# Patient Record
Sex: FEMALE | Race: BLACK OR AFRICAN AMERICAN | Employment: FULL TIME | URBAN - METROPOLITAN AREA
[De-identification: names, ages, dates, MRNs, and addresses within clinical notes are randomized per-mention and may not be internally consistent; named-entity substitution may affect disease eponyms.]

---

## 2018-02-16 PROBLEM — Z34.01 PRIMIGRAVIDA IN FIRST TRIMESTER: Status: ACTIVE | Noted: 2018-02-16

## 2018-03-14 PROBLEM — D57.1 MATERNAL SICKLE CELL ANEMIA COMPLICATING PREGNANCY IN FIRST TRIMESTER (HCC): Status: ACTIVE | Noted: 2018-03-14

## 2018-03-14 PROBLEM — O99.841 PREGNANCY AFFECTED BY PREVIOUS BARIATRIC SURGERY, CURRENTLY IN FIRST TRIMESTER: Status: ACTIVE | Noted: 2018-03-14

## 2018-03-14 PROBLEM — J45.909 ASTHMA AFFECTING PREGNANCY, ANTEPARTUM: Status: ACTIVE | Noted: 2018-03-14

## 2018-03-14 PROBLEM — O99.011 MATERNAL SICKLE CELL ANEMIA COMPLICATING PREGNANCY IN FIRST TRIMESTER (HCC): Status: ACTIVE | Noted: 2018-03-14

## 2018-03-14 PROBLEM — O99.519 ASTHMA AFFECTING PREGNANCY, ANTEPARTUM: Status: ACTIVE | Noted: 2018-03-14

## 2018-03-14 PROBLEM — O99.211 OBESITY AFFECTING PREGNANCY IN FIRST TRIMESTER: Status: ACTIVE | Noted: 2018-03-14

## 2018-03-14 PROBLEM — Z36.82 NUCHAL TRANSLUCENCY OF FETUS ON PRENATAL ULTRASOUND: Status: ACTIVE | Noted: 2018-03-14

## 2018-03-14 PROBLEM — O09.91 HIGH-RISK PREGNANCY IN FIRST TRIMESTER: Status: ACTIVE | Noted: 2018-02-16

## 2018-04-09 PROBLEM — O09.92 HIGH-RISK PREGNANCY IN SECOND TRIMESTER: Status: ACTIVE | Noted: 2018-02-16

## 2018-04-09 PROBLEM — O99.842 PREGNANCY AFFECTED BY PREVIOUS BARIATRIC SURGERY, CURRENTLY IN SECOND TRIMESTER: Status: ACTIVE | Noted: 2018-03-14

## 2018-04-09 PROBLEM — O99.012 MATERNAL SICKLE CELL ANEMIA COMPLICATING PREGNANCY IN SECOND TRIMESTER (HCC): Status: ACTIVE | Noted: 2018-03-14

## 2018-04-09 PROBLEM — O99.212 OBESITY AFFECTING PREGNANCY IN SECOND TRIMESTER: Status: ACTIVE | Noted: 2018-03-14

## 2018-07-11 PROBLEM — O99.843 PREGNANCY AFFECTED BY PREVIOUS BARIATRIC SURGERY, CURRENTLY IN THIRD TRIMESTER: Status: ACTIVE | Noted: 2018-03-14

## 2018-07-11 PROBLEM — O99.013 MATERNAL SICKLE CELL ANEMIA COMPLICATING PREGNANCY IN THIRD TRIMESTER (HCC): Status: ACTIVE | Noted: 2018-03-14

## 2018-07-11 PROBLEM — O99.613 GASTROESOPHAGEAL REFLUX DURING PREGNANCY, ANTEPARTUM, THIRD TRIMESTER: Status: ACTIVE | Noted: 2018-07-11

## 2018-07-11 PROBLEM — O99.213 OBESITY AFFECTING PREGNANCY IN THIRD TRIMESTER: Status: ACTIVE | Noted: 2018-03-14

## 2018-07-11 PROBLEM — K21.9 GASTROESOPHAGEAL REFLUX DURING PREGNANCY, ANTEPARTUM, THIRD TRIMESTER: Status: ACTIVE | Noted: 2018-07-11

## 2018-07-11 PROBLEM — O09.93 HIGH-RISK PREGNANCY IN THIRD TRIMESTER: Status: ACTIVE | Noted: 2018-02-16

## 2018-08-29 PROBLEM — R82.71 GBS BACTERIURIA: Status: ACTIVE | Noted: 2018-02-07

## 2018-09-10 ENCOUNTER — HOSPITAL ENCOUNTER (EMERGENCY)
Age: 28
Discharge: HOME | End: 2018-09-10
Attending: OBSTETRICS & GYNECOLOGY | Admitting: OBSTETRICS & GYNECOLOGY
Payer: COMMERCIAL

## 2018-09-10 VITALS
BODY MASS INDEX: 36.31 KG/M2 | HEIGHT: 63 IN | RESPIRATION RATE: 16 BRPM | OXYGEN SATURATION: 97 % | HEART RATE: 67 BPM | TEMPERATURE: 98.5 F | SYSTOLIC BLOOD PRESSURE: 112 MMHG | DIASTOLIC BLOOD PRESSURE: 64 MMHG

## 2018-09-10 PROBLEM — R10.9 ABDOMINAL PAIN DURING PREGNANCY IN THIRD TRIMESTER: Status: ACTIVE | Noted: 2018-09-10

## 2018-09-10 PROBLEM — O26.893 ABDOMINAL PAIN DURING PREGNANCY IN THIRD TRIMESTER: Status: ACTIVE | Noted: 2018-09-10

## 2018-09-10 PROCEDURE — 74011250637 HC RX REV CODE- 250/637: Performed by: OBSTETRICS & GYNECOLOGY

## 2018-09-10 PROCEDURE — 59025 FETAL NON-STRESS TEST: CPT

## 2018-09-10 PROCEDURE — 99283 EMERGENCY DEPT VISIT LOW MDM: CPT

## 2018-09-10 PROCEDURE — 75810000275 HC EMERGENCY DEPT VISIT NO LEVEL OF CARE: Performed by: EMERGENCY MEDICINE

## 2018-09-10 RX ORDER — ZOLPIDEM TARTRATE 5 MG/1
5 TABLET ORAL
Status: COMPLETED | OUTPATIENT
Start: 2018-09-11 | End: 2018-09-10

## 2018-09-10 RX ADMIN — ZOLPIDEM TARTRATE 5 MG: 5 TABLET ORAL at 23:43

## 2018-09-10 NOTE — IP AVS SNAPSHOT
Summary of Care Report The Summary of Care report has been created to help improve care coordination. Users with access to [employer] or [address] (Web-based application) may access additional patient information including the Discharge Summary. If you are not currently a [address] user and need more information, please call the number listed below in the [place] [address] section and ask to be connected with Medical Records. Facility Information Name Address Phone [address] [place] [address] [address] [place] [id number] [phone] Patient Information Patient Name Sex SNOW Jasso ([id number]) Female 1990 Discharge Information Admitting Provider Service Area Unit Donna Hoover MD / [id number] [facility] [address] / [phone] Discharge Provider Discharge Date/Time Discharge Disposition Destination (none) (none) (none) (none) Patient Language Language ENGLISH [13] Hospital Problems as of 9/10/2018  Reviewed: 2018 11:07 PM by France Smith MD  
  
  
  
 Class Noted - Resolved Last Modified POA Active Problems Abdominal pain during pregnancy in third trimester  9/10/2018 - Present 9/10/2018 by Donna Hoover MD Unknown Entered by Donna Hoover MD  
  
Non-Hospital Problems as of 9/10/2018  Reviewed: 2018 11:07 PM by France Smith MD  
  
  
  
 Class Noted - Resolved Last Modified Active Problems High-risk pregnancy in third trimester  2018 - Present 2018 by France Smith MD  
  Entered by France Smith MD  
  Overview Addendum 2018 11:30 PM by France Smith MD  
   8 wk (2018):  10 m s/p gastric sleeve ([place]). Has lost 117 lb. LDA  from 12-16 wk.  Early glucola per pt doesn't vinod sugar, if not tolerated, alt screen: fasting and postprandial blood sugars for one week. Nutritional status, watch for IUGR 
+ sickle cell trait, FOB status:  pending:  ? urine cx q trimester:  1st trimester neg,  2nd trimester neg,  3rd trimester. No h/o HSV. 12 wk: Advised to start baby asa/stop 36 wk 
7/11/2018 at [facility]: Normal repeat echo. AC 40%, overall 54%, CHRISTINA 17cm, BPP 8/8, Dopplers WNL 35 wk:  Stopped baby aspirin a a few weeks ago. Pregnancy affected by previous bariatric surgery, currently in third trimester  3/14/2018 - Present 7/11/2018 by Elner Moritz, MD  
  Entered by Jonah Rowe RN Overview Addendum 7/11/2018 10:46 AM by Elner Moritz, MD  
   3/14/2018 at [facility]:  Hx of Gastric Sleeve in 4/2017. Lost 117# 
5/21/2018 at [facility]: Patient having right sided pain and will have radiology appt 5/23/2018 for review of gallbladder. 7/11/2018 at [facility]:  Was given Rx for meter June 2018- pt states she has been testing blood sugars and they have been between . Glucose logs given to patient and encouraged to send to [place] for us to review. · Patient to send logs next week; if >2 elevations during week of testing, then: · Refer to [facility]. · Then refer back to MFM for DM management to be seen in ~2-3 weeks. Obesity affecting pregnancy in third trimester  3/14/2018 - Present 7/11/2018 by Maryjane Best RN Entered by Jonah Rowe RN Asthma affecting pregnancy, antepartum  3/14/2018 - Present 7/11/2018 by Maryjane Best RN Entered by Jonah Rowe RN Overview Addendum 7/11/2018 10:30 AM by Maryjane Best RN  
   3/14/2018 at [facility]:  Stable on Albuterol prn. Pt states only has flairs when \"has a cold\". 4/9/2018 at [facility]:  Stable on Albuterol prn.  
7/11/2018 at [facility]:  stable · Continue Albuterol prn.  
  
  
  Maternal sickle cell anemia complicating pregnancy in third trimester ([address]  3/14/2018 - Present 8/13/2018 by Samuel Valiente MD  
 Entered by Annie Kennedy RN Overview Addendum 8/13/2018  9:32 AM by Trupti Zavala MD  
   3/14/2018 at [facility]:  Pt with Sickle Cell Trait. FOB states negative. 3/19/2018:  FOB, Janet Kiran, is negative carrier. Tested 3/13/18. 
7/11/2018 at [facility]:  Patient is at increased risk for asymptomatic bacturia and UTI. Needs screening for UTI with UA and culture at next OB visit 8/8/18 bactiuria  Enterobacter cloacae -10,000-25,000 colony forming units per ml (7/25/18 txd E coli uti). Macrobid bid x 7 d then qd suppression Thickening of nuchal fold  3/14/2018 - Present 7/11/2018 by Pam Parr MD  
  Entered by Annie Kennedy RN Overview Addendum 7/11/2018 10:47 AM by Pam Parr MD  
   3/14/2018 at [facility]:  NT upper normal limits at 2.7 mm extending to mid back, no septations, not >2 SD above normal but appearance raises clinical suspicion, NB present. Genetic counseling done by physician today. Pt wants NIPT, to primary OB for redraw on 3/20/2018---> low risk Panorama 4/9/2018 at [facility]:  Normal early Anatomy/Fetal Echo. This is reassuring with a negative NIPS. Questions answered. Would like to reevaluate anatomy and echo in 6 weeks. 16 + wk:  declines MSAFP. 
5/21/2018 at [facility]: Appropriate fetal growth without IUGR, Normal fetal echo, reassuring fetal status. No aneuploidy markers seen. %, overall 53%, CHRISTINA  19.5cm, UA Dopplers WNL. Patient and  reassured. Small risk of cardiac defect. 7/11/2018 [facility]: reassuring fetal status. Gastroesophageal reflux during pregnancy, antepartum, third trimester  7/11/2018 - Present 7/11/2018 by Pam Parr MD  
  Entered by Hector Keller RN Overview Addendum 7/11/2018 10:39 AM by Hector Keller RN  
    
7/11/2018 at [facility]: Pt with increase in nausea and reflux. Using Prilosec daily and states it hasn't been working well.  
· Script sent for to add zantac BID 
  
  
 GBS bacteriuria  2/7/2018 - Present 8/29/2018 by Amador Johns MD  
  Entered by Amador Johns MD  
  Overview Signed 8/29/2018 11:39 PM by Amador Johns MD  
   Needs GBS PROPHYLAXIS during labor You are allergic to the following No active allergies Current Discharge Medication List  
  
ASK your doctor about these medications Dose & Instructions Dispensing Information Comments  
 albuterol 5 mg/mL nebulizer solution Commonly known as:  PROVENTIL  
 by Nebulization route once. Refills:  0 Blood-Glucose Meter monitoring kit Check blood sugar before first meal, an hour after each meal and at bedtime Quantity:  1 Kit Refills:  0  
   
 CALCIUM 500 PO Dose:  1000 mg Take 1,000 mg by mouth. Refills:  0  
   
 glucose blood VI test strips strip Commonly known as:  ASCENSIA AUTODISC VI, ONE TOUCH ULTRA TEST VI Check blood sugar before first meal, an hour after each meal and at bedtime Quantity:  200 Strip Refills:  3 Lancets Misc Check blood sugar before first meal, an hour after each meal and at bedtime Quantity:  1 Each Refills:  11  
   
 nitrofurantoin (macrocrystal-monohydrate) 100 mg capsule Commonly known as:  MACROBID Take one tab twice a day for ten days, then one a day until delivery. Quantity:  60 Cap Refills:  0  
   
 omeprazole 20 mg capsule Commonly known as:  PRILOSEC Dose:  20 mg Take 20 mg by mouth daily. Refills:  0  
   
 ondansetron hcl 8 mg tablet Commonly known as:  Donnel Blank Dose:  8 mg Take 1 Tab by mouth every eight (8) hours as needed for Nausea. Quantity:  30 Tab Refills:  3 PNV66-Iron Fumarate-FA-DSS-DHA 27-1. 25- mg Cap Commonly known as:  PNV-DHA + DOCUSATE Dose:  1 Cap Take 1 Cap by mouth daily. Which ever generic PNV/DHA insurance will cover  Indications: pregnancy Quantity:  90 Cap Refills:  3  
   
 raNITIdine 150 mg tablet Commonly known as:  ZANTAC Dose:  150 mg Take 1 Tab by mouth two (2) times a day. Indications: gastroesophageal reflux disease, Heartburn Quantity:  60 Tab Refills:  5  
   
 TYLENOL 325 mg tablet Generic drug:  acetaminophen Take  by mouth every four (4) hours as needed for Pain. Refills:  0  
   
 VITAMIN D3 2,000 unit Tab Generic drug:  cholecalciferol (vitamin D3) Take  by mouth. Refills:  0 Follow-up Information None Discharge Instructions Keep your appointment in the office as scheduled. Return to the hospital with regular frequent contractions, bright red vaginal bleeding, signs your water has broken, a decrease in baby movement or any other concerns. Week 38 of Your Pregnancy: Care Instructions Your Care Instructions Believe it or not, your baby is almost here. You may have ideas about your baby's personality because of how much he or she moves. Or you may have noticed how he or she responds to sounds, warmth, cold, and light. You may even know what kind of music your baby likes. By now, you have a better idea of what to expect during delivery. You may have talked about your birth preferences with your doctor. But even if you want a vaginal birth, it is a good idea to learn about  births.  birth means that your baby is born through a cut (incision) in your lower belly. It is sometimes the best choice for the health of the baby and the mother. This care sheet can help you understand  births. It also gives you information about what to expect after your baby is born. And it helps you understand more about postpartum depression. Follow-up care is a key part of your treatment and safety. Be sure to make and go to all appointments, and call your doctor if you are having problems. It's also a good idea to know your test results and keep a list of the medicines you take. How can you care for yourself at home? Learn about  birth · Most C-sections are unplanned. They are done because of problems that occur during labor. These problems might include: 
¨ Labor that slows or stops. ¨ High blood pressure or other problems for the mother. ¨ Signs of distress in the baby. These signs may include a very fast or slow heart rate. · Although most mothers and babies do well after , it is major surgery. It has more risks than a vaginal delivery. · In some cases, a planned  may be safer than a vaginal delivery. This may be the case if: ¨ The mother has a health problem, such as a heart condition. ¨ The baby isn't in a head-down position for delivery. This is called a breech position. ¨ The uterus has scars from past surgeries. This could increase the chance of a tear in the uterus. ¨ There is a problem with the placenta. ¨ The mother has an infection, such as genital herpes, that could be spread to the baby. ¨ The mother is having twins or more. ¨ The baby weighs 9 to 10 pounds or more. · Because of the risks of , planned C-sections generally should be done only for medical reasons. And a planned  should be done at 39 weeks or later unless there is a medical reason to do it sooner. Know what to expect after delivery, and plan for the first few weeks at home · You, your baby, and your partner or  will get identification bands. Only people with matching bands can  the baby from the nursery. · You will learn how to feed, diaper, and bathe your baby. And you will learn how to care for the umbilical cord stump. If your baby will be circumcised, you will also learn how to care for that. · Ask people to wait to visit you until you are at home. And ask them to wash their hands before they touch your baby. · Make sure you have another adult in your home for at least 2 or 3 days after the birth. · During the first 2 weeks, limit when friends and family can visit. · Do not allow visitors who have colds or infections. Make sure all visitors are up to date with their vaccinations. Never let anyone smoke around your baby. · Try to nap when the baby naps. Be aware of postpartum depression · \"Baby blues\" are common for the first 1 to 2 weeks after birth. You may cry or feel sad or irritable for no reason. · For some women, these feelings last longer and are more intense. This is called postpartum depression. · If your symptoms last for more than a few weeks or you feel very depressed, ask your doctor for help. · Postpartum depression can be treated. Support groups and counseling can help. Sometimes medicine can also help. Where can you learn more? Go to http://ifeoma-pancho.info/. Enter B044 in the search box to learn more about \"Week 38 of Your Pregnancy: Care Instructions. \" Current as of: 2017 Content Version: 11.7 © 8387-7374 [employer]. Care instructions adapted under license by [employer] (which disclaims liability or warranty for this information). If you have questions about a medical condition or this instruction, always ask your healthcare professional. [place] [address] any warranty or liability for your use of this information. Pregnancy Precautions: Care Instructions Your Care Instructions There is no sure way to prevent labor before your due date ( labor) or to prevent most other pregnancy problems. But there are things you can do to increase your chances of a healthy pregnancy. Go to your appointments, follow your doctor's advice, and take good care of yourself. Eat well, and exercise (if your doctor agrees). And make sure to drink plenty of water. Follow-up care is a key part of your treatment and safety. Be sure to make and go to all appointments, and call your doctor if you are having problems.  It's also a good idea to know your test results and keep a list of the medicines you take. How can you care for yourself at home? · Make sure you go to your prenatal appointments. At each visit, your doctor will check your blood pressure. Your doctor will also check to see if you have protein in your urine. High blood pressure and protein in urine are signs of preeclampsia. This condition can be dangerous for you and your baby. · Drink plenty of fluids, enough so that your urine is light yellow or clear like water. Dehydration can cause contractions. If you have kidney, heart, or liver disease and have to limit fluids, talk with your doctor before you increase the amount of fluids you drink. · Tell your doctor right away if you notice any symptoms of an infection, such as: ¨ Burning when you urinate. ¨ A foul-smelling discharge from your vagina. ¨ Vaginal itching. ¨ Unexplained fever. ¨ Unusual pain or soreness in your uterus or lower belly. · Eat a balanced diet. Include plenty of foods that are high in calcium and iron. ¨ Foods high in calcium include milk, cheese, yogurt, almonds, and broccoli. ¨ Foods high in iron include red meat, shellfish, poultry, eggs, beans, raisins, whole-grain bread, and leafy green vegetables. · Do not smoke. If you need help quitting, talk to your doctor about stop-smoking programs and medicines. These can increase your chances of quitting for good. · Do not drink alcohol or use illegal drugs. · Follow your doctor's directions about activity. Your doctor will let you know how much, if any, exercise you can do. · Ask your doctor if you can have sex. If you are at risk for early labor, your doctor may ask you to not have sex. · Take care to prevent falls. During pregnancy, your joints are loose, and your balance is off. Sports such as bicycling, skiing, or in-line skating can increase your risk of falling. And don't ride horses or motorcycles, dive, water ski, scuba dive, or parachute jump while you are pregnant. · Avoid getting very hot. Do not use saunas or hot tubs. Avoid staying out in the sun in hot weather for long periods. Take acetaminophen (Tylenol) to lower a high fever. · Do not take any over-the-counter or herbal medicines or supplements without talking to your doctor or pharmacist first. 
When should you call for help? Call 911 anytime you think you may need emergency care. For example, call if: 
  · You passed out (lost consciousness).  
  · You have severe vaginal bleeding.  
  · You have severe pain in your belly or pelvis.  
  · You have had fluid gushing or leaking from your vagina and you know or think the umbilical cord is bulging into your vagina. If this happens, immediately get down on your knees so your rear end (buttocks) is higher than your head. This will decrease the pressure on the cord until help arrives.  
[facility] your doctor now or seek immediate medical care if: 
  · You have signs of preeclampsia, such as: 
¨ Sudden swelling of your face, hands, or feet. ¨ New vision problems (such as dimness or blurring). ¨ A severe headache.  
  · You have any vaginal bleeding.  
  · You have belly pain or cramping.  
  · You have a fever.  
  · You have had regular contractions (with or without pain) for an hour. This means that you have 8 or more within 1 hour or 4 or more in 20 minutes after you change your position and drink fluids.  
  · You have a sudden release of fluid from your vagina.  
  · You have low back pain or pelvic pressure that does not go away.  
  · You notice that your baby has stopped moving or is moving much less than normal.  
 Watch closely for changes in your health, and be sure to contact your doctor if you have any problems. Where can you learn more? Go to http://ifeoma-pancho.info/. Enter [id number] in the search box to learn more about \"Pregnancy Precautions: Care Instructions. \" Current as of: November 21, 2017 Content Version: 11.7 © 9656-4035 [employer]. Care instructions adapted under license by [employer] (which disclaims liability or warranty for this information). If you have questions about a medical condition or this instruction, always ask your healthcare professional. [place] [address] any warranty or liability for your use of this information. Chart Review Routing History No Routing History on File

## 2018-09-10 NOTE — IP AVS SNAPSHOT
[address] 
[phone] Patient: Steve Bermudez MRN: [id number] INC:[id number] About your hospitalization You were admitted on:  N/A You last received care in the:  E 4 ERIC You were discharged on:  September 10, 2018 Why you were hospitalized Your primary diagnosis was:  Not on File Your diagnoses also included:  Abdominal Pain During Pregnancy In Third Trimester Follow-up Information None Your Scheduled Appointments Tuesday September 18, 2018  8:45 AM EDT  
(Arrive by 8:30 AM) Return OB with Julio Garza, [address] ([facility]) [address] [address] [phone] Discharge Orders None A check alyssa indicates which time of day the medication should be taken. My Medications ASK your doctor about these medications Instructions Each Dose to Equal  
 Morning Noon Evening Bedtime  
 albuterol 5 mg/mL nebulizer solution Commonly known as:  PROVENTIL Your last dose was: Your next dose is:    
   
   
 by Nebulization route once. Blood-Glucose Meter monitoring kit Your last dose was: Your next dose is:    
   
   
 Check blood sugar before first meal, an hour after each meal and at bedtime CALCIUM 500 PO Your last dose was: Your next dose is: Take 1,000 mg by mouth. 1000 mg  
    
   
   
   
  
 glucose blood VI test strips strip Commonly known as:  ASCENSIA AUTODISC VI, ONE TOUCH ULTRA TEST VI Your last dose was: Your next dose is:    
   
   
 Check blood sugar before first meal, an hour after each meal and at bedtime Lancets Misc Your last dose was: Your next dose is: Check blood sugar before first meal, an hour after each meal and at bedtime  
     
   
   
   
  
 nitrofurantoin (macrocrystal-monohydrate) 100 mg capsule Commonly known as:  MACROBID Your last dose was: Your next dose is: Take one tab twice a day for ten days, then one a day until delivery. omeprazole 20 mg capsule Commonly known as:  PRILOSEC Your last dose was: Your next dose is: Take 20 mg by mouth daily. 20 mg  
    
   
   
   
  
 ondansetron hcl 8 mg tablet Commonly known as:  Sheryl Actis Your last dose was: Your next dose is: Take 1 Tab by mouth every eight (8) hours as needed for Nausea. 8 mg PNV66-Iron Fumarate-FA-DSS-DHA 27-1. 25- mg Cap Commonly known as:  PNV-DHA + DOCUSATE Your last dose was: Your next dose is: Take 1 Cap by mouth daily. Which ever generic PNV/DHA insurance will cover  Indications: pregnancy 1 Cap  
    
   
   
   
  
 raNITIdine 150 mg tablet Commonly known as:  ZANTAC Your last dose was: Your next dose is: Take 1 Tab by mouth two (2) times a day. Indications: gastroesophageal reflux disease, Heartburn 150 mg  
    
   
   
   
  
 TYLENOL 325 mg tablet Generic drug:  acetaminophen Your last dose was: Your next dose is: Take  by mouth every four (4) hours as needed for Pain. VITAMIN D3 2,000 unit Tab Generic drug:  cholecalciferol (vitamin D3) Your last dose was: Your next dose is: Take  by mouth. Discharge Instructions Keep your appointment in the office as scheduled. Return to the hospital with regular frequent contractions, bright red vaginal bleeding, signs your water has broken, a decrease in baby movement or any other concerns. Week 38 of Your Pregnancy: Care Instructions Your Care Instructions Believe it or not, your baby is almost here. You may have ideas about your baby's personality because of how much he or she moves. Or you may have noticed how he or she responds to sounds, warmth, cold, and light. You may even know what kind of music your baby likes. By now, you have a better idea of what to expect during delivery. You may have talked about your birth preferences with your doctor. But even if you want a vaginal birth, it is a good idea to learn about  births.  birth means that your baby is born through a cut (incision) in your lower belly. It is sometimes the best choice for the health of the baby and the mother. This care sheet can help you understand  births. It also gives you information about what to expect after your baby is born. And it helps you understand more about postpartum depression. Follow-up care is a key part of your treatment and safety. Be sure to make and go to all appointments, and call your doctor if you are having problems. It's also a good idea to know your test results and keep a list of the medicines you take. How can you care for yourself at home? Learn about  birth · Most C-sections are unplanned. They are done because of problems that occur during labor. These problems might include: 
¨ Labor that slows or stops. ¨ High blood pressure or other problems for the mother. ¨ Signs of distress in the baby. These signs may include a very fast or slow heart rate. · Although most mothers and babies do well after , it is major surgery. It has more risks than a vaginal delivery. · In some cases, a planned  may be safer than a vaginal delivery. This may be the case if: ¨ The mother has a health problem, such as a heart condition. ¨ The baby isn't in a head-down position for delivery. This is called a breech position. ¨ The uterus has scars from past surgeries. This could increase the chance of a tear in the uterus. ¨ There is a problem with the placenta. ¨ The mother has an infection, such as genital herpes, that could be spread to the baby. ¨ The mother is having twins or more. ¨ The baby weighs 9 to 10 pounds or more. · Because of the risks of , planned C-sections generally should be done only for medical reasons. And a planned  should be done at 39 weeks or later unless there is a medical reason to do it sooner. Know what to expect after delivery, and plan for the first few weeks at home · You, your baby, and your partner or  will get identification bands. Only people with matching bands can  the baby from the nursery. · You will learn how to feed, diaper, and bathe your baby. And you will learn how to care for the umbilical cord stump. If your baby will be circumcised, you will also learn how to care for that. · Ask people to wait to visit you until you are at home. And ask them to wash their hands before they touch your baby. · Make sure you have another adult in your home for at least 2 or 3 days after the birth. · During the first 2 weeks, limit when friends and family can visit. · Do not allow visitors who have colds or infections. Make sure all visitors are up to date with their vaccinations. Never let anyone smoke around your baby. · Try to nap when the baby naps. Be aware of postpartum depression · \"Baby blues\" are common for the first 1 to 2 weeks after birth. You may cry or feel sad or irritable for no reason. · For some women, these feelings last longer and are more intense. This is called postpartum depression. · If your symptoms last for more than a few weeks or you feel very depressed, ask your doctor for help. · Postpartum depression can be treated. Support groups and counseling can help. Sometimes medicine can also help. Where can you learn more? Go to http://ifeoma-pancho.info/. Enter B044 in the search box to learn more about \"Week 38 of Your Pregnancy: Care Instructions. \" Current as of: 2017 Content Version: 11.7 © 4489-0390 [employer]. Care instructions adapted under license by [employer] (which disclaims liability or warranty for this information). If you have questions about a medical condition or this instruction, always ask your healthcare professional. [address] any warranty or liability for your use of this information. Pregnancy Precautions: Care Instructions Your Care Instructions There is no sure way to prevent labor before your due date ( labor) or to prevent most other pregnancy problems. But there are things you can do to increase your chances of a healthy pregnancy. Go to your appointments, follow your doctor's advice, and take good care of yourself. Eat well, and exercise (if your doctor agrees). And make sure to drink plenty of water. Follow-up care is a key part of your treatment and safety. Be sure to make and go to all appointments, and call your doctor if you are having problems. It's also a good idea to know your test results and keep a list of the medicines you take. How can you care for yourself at home? · Make sure you go to your prenatal appointments. At each visit, your doctor will check your blood pressure. Your doctor will also check to see if you have protein in your urine. High blood pressure and protein in urine are signs of preeclampsia. This condition can be dangerous for you and your baby. · Drink plenty of fluids, enough so that your urine is light yellow or clear like water. Dehydration can cause contractions. If you have kidney, heart, or liver disease and have to limit fluids, talk with your doctor before you increase the amount of fluids you drink. · Tell your doctor right away if you notice any symptoms of an infection, such as: ¨ Burning when you urinate. ¨ A foul-smelling discharge from your vagina. ¨ Vaginal itching. ¨ Unexplained fever. ¨ Unusual pain or soreness in your uterus or lower belly. · Eat a balanced diet. Include plenty of foods that are high in calcium and iron. ¨ Foods high in calcium include milk, cheese, yogurt, almonds, and broccoli. ¨ Foods high in iron include red meat, shellfish, poultry, eggs, beans, raisins, whole-grain bread, and leafy green vegetables. · Do not smoke. If you need help quitting, talk to your doctor about stop-smoking programs and medicines. These can increase your chances of quitting for good. · Do not drink alcohol or use illegal drugs. · Follow your doctor's directions about activity. Your doctor will let you know how much, if any, exercise you can do. · Ask your doctor if you can have sex. If you are at risk for early labor, your doctor may ask you to not have sex. · Take care to prevent falls. During pregnancy, your joints are loose, and your balance is off. Sports such as bicycling, skiing, or in-line skating can increase your risk of falling. And don't ride horses or motorcycles, dive, water ski, scuba dive, or parachute jump while you are pregnant. · Avoid getting very hot. Do not use saunas or hot tubs. Avoid staying out in the sun in hot weather for long periods. Take acetaminophen (Tylenol) to lower a high fever. · Do not take any over-the-counter or herbal medicines or supplements without talking to your doctor or pharmacist first. 
When should you call for help? Call 911 anytime you think you may need emergency care.  For example, call if: 
  · You passed out (lost consciousness).  
  · You have severe vaginal bleeding.  
  · You have severe pain in your belly or pelvis.  
  · You have had fluid gushing or leaking from your vagina and you know or think the umbilical cord is bulging into your vagina. If this happens, immediately get down on your knees so your rear end (buttocks) is higher than your head. This will decrease the pressure on the cord until help arrives.  
[facility] your doctor now or seek immediate medical care if: 
  · You have signs of preeclampsia, such as: 
¨ Sudden swelling of your face, hands, or feet. ¨ New vision problems (such as dimness or blurring). ¨ A severe headache.  
  · You have any vaginal bleeding.  
  · You have belly pain or cramping.  
  · You have a fever.  
  · You have had regular contractions (with or without pain) for an hour. This means that you have 8 or more within 1 hour or 4 or more in 20 minutes after you change your position and drink fluids.  
  · You have a sudden release of fluid from your vagina.  
  · You have low back pain or pelvic pressure that does not go away.  
  · You notice that your baby has stopped moving or is moving much less than normal.  
 Watch closely for changes in your health, and be sure to contact your doctor if you have any problems. Where can you learn more? Go to http://ifeoma-pancho.info/. Enter [id number] in the search box to learn more about \"Pregnancy Precautions: Care Instructions. \" Current as of: November 21, 2017 Content Version: 11.7 © 5359-7436 [employer]. Care instructions adapted under license by [employer] (which disclaims liability or warranty for this information). If you have questions about a medical condition or this instruction, always ask your healthcare professional. [place] [address] any warranty or liability for your use of this information. Introducing [facility]! Dear Fabine Elkins: Thank you for requesting a [employer] account. Our records indicate that you already have an active [employer] account. You can access your account anytime at [email or web address] Did you know that you can access your hospital and ER discharge instructions at any time in [email or web address]? You can also review all of your test results from your hospital stay or ER visit. Additional Information If you have questions, please visit the Frequently Asked Questions section of the [employer] website at [email or web address] Remember, [email or web address] is NOT to be used for urgent needs. For medical emergencies, dial 911. Now available from your iPhone and Android! Introducing Dawson Brody As a [employer] patient, I wanted to make you aware of our electronic visit tool called Dawson Brody. [employer] 24/7 allows you to connect within minutes with a medical provider 24 hours a day, seven days a week via a mobile device or tablet or logging into a secure website from your computer. You can access Dawson Brody from anywhere in the [place]. A virtual visit might be right for you when you have a simple condition and feel like you just dont want to get out of bed, or cant get away from work for an appointment, when your regular [employer] provider is not available (evenings, weekends or holidays), or when youre out of town and need minor care. Electronic visits cost only $49 and if the [employer] 24/7 provider determines a prescription is needed to treat your condition, one can be electronically transmitted to a nearby pharmacy*. Please take a moment to enroll today if you have not already done so. The enrollment process is free and takes just a few minutes. To enroll, please download the [employer] 24/7 ruiz to your tablet or phone, or visit [email or web address] to enroll on your computer. And, as an [address] [place] [address] patient with a [employer] account, the results of your visits will be scanned into your electronic medical record and your primary care provider will be able to view the scanned results. We urge you to continue to see your regular [facility] provider for your ongoing medical care. And while your primary care provider may not be the one available when you seek a Dawson Brody virtual visit, the peace of mind you get from getting a real diagnosis real time can be priceless. For more information on Dawson Brody, view our Frequently Asked Questions (FAQs) at [email or web address]. Sincerely, 
 
Partha Toscano MD 
Chief Medical Officer [employer] *:  certain medications cannot be prescribed via Dawson Brody Providers Seen During Your Hospitalization Provider Specialty Primary office phone Sherl Holstein, MD Obstetrics & Gynecology [phone] Your Primary Care Physician (PCP) Primary Care Physician Office Phone Office Fax NONE ** None ** ** None ** You are allergic to the following No active allergies Recent Documentation Height BMI OB Status Smoking Status 1.6 m 36.31 kg/m2 Pregnant Never Smoker Patient Belongings The following personal items are in your possession at time of discharge: 
                             
 
  
  
 Please provide this summary of care documentation to your next provider. Signatures-by signing, you are acknowledging that this After Visit Summary has been reviewed with you and you have received a copy. Patient Signature:  ____________________________________________________________ Date:  ____________________________________________________________  
  
Laine Artist Provider Signature:  ____________________________________________________________ Date:  ____________________________________________________________

## 2018-09-11 NOTE — PROGRESS NOTES
Pt given discharge instructions. Verbalized understanding of her discharge instructions. Pt given Ambien and side effects reviewed with her. Pt ambulated out with her significant other.

## 2018-09-11 NOTE — PROGRESS NOTES
9/10/2018 RE: Raji Moreno To Whom it May Concern: This is to certify that Aramjannette Taty was a visitor with the above patient on 9/10/2018. Thank you for your assistance in this matter. Sincerely, Ignacio Justice RN

## 2018-09-11 NOTE — PROGRESS NOTES
Pt present to the ERIC for complaints of contractions. No leaking of fluid or bleeding and fetal movement is WNL. Pt was seen in the office this morning and was 1cm. Pt last had intercourse this morning after her appointment. She has had intermittent contractions since yesterday but tonight about 1830 they became more regular, Q8 min, and more intense. Pt placed on the monitor.

## 2018-09-11 NOTE — H&P
CC 
Chief Complaint Patient presents with  Contractions History: 
 
33 y.o. female at 38w2d weeks gestation who requesting evaluation for Uterine contractions all day. Hasn't slept since last night. No vb or lof. Fetal movement has been normal 
 
HISTORY: 
OB History  Para Term  AB Living 1 0 0 0 0 0 SAB TAB Ectopic Molar Multiple Live Births  
0 0 0 0 0 0 # Outcome Date GA Lbr Lucas/2nd Weight Sex Delivery Anes PTL Lv  
1 Current History Sexual Activity  Sexual activity: Yes  Partners: Male  Birth control/ protection: None Patient's last menstrual period was 2017. Social History Social History  Marital status: SINGLE Spouse name: N/A  
 Number of children: 0  
 Years of education: 12 Occupational History  Not on file. Social History Main Topics  Smoking status: Never Smoker  Smokeless tobacco: Never Used  Alcohol use No  
 Drug use: No  
 Sexual activity: Yes  
  Partners: Male Birth control/ protection: None Other Topics Concern  Not on file Social History Narrative Past Surgical History:  
Procedure Laterality Date  HX GASTRIC BYPASS  2017  
 sleeve, ? [place]  
 HX OTHER SURGICAL Past Medical History:  
Diagnosis Date  Anemia  Asthma  Sickle cell trait (HCC)   
 trait not disease  [facility] ROS: 
Negative: 
headache , nausea and vomiting, vaginal bleeding  and visual disturbances. Positive: 
contractions. PHYSICAL EXAM: 
Blood pressure 112/64, pulse 67, temperature 98.5 °F (36.9 °C), resp. rate 16, height 5' 3\" (1.6 m), last menstrual period 2017, SpO2 97 %. General: well developed and well nourished Resp:  breath sounds clear and equal bilaterally Card:  RRR, no MRG Abd: WNL. Uterine contractions: irregular, every 8 minutes Fetal Assessment: Baseline FHR: 120 per minute Fetal heart variability: moderate Fetal Heart Rate decelerations: none Fetal Heart Rate accelerations: yes Prestentation: vertex by exam, Pelvic:   External- normal EGBSU w/o lesions SVE- Cervical Exam: too posterior to reach  
0% effaced   
-3 station Ext: edema, clonus and DTR's normal 
 
Assessment: 
33 y.o. female at 38w2d weeks gestation Reassuring fetal status Not in labor. Plan: 
[place] given for sleep., Discharge home with routine labor instructions and warning signs, Keep follow up appointment with regular provider as scheduled, Instructed in fetal activity monitoring Galileo Sinha MD

## 2018-09-11 NOTE — DISCHARGE INSTRUCTIONS
Keep your appointment in the office as scheduled. Return to the hospital with regular frequent contractions, bright red vaginal bleeding, signs your water has broken, a decrease in baby movement or any other concerns. Week 38 of Your Pregnancy: Care Instructions  Your Care Instructions    Believe it or not, your baby is almost here. You may have ideas about your baby's personality because of how much he or she moves. Or you may have noticed how he or she responds to sounds, warmth, cold, and light. You may even know what kind of music your baby likes. By now, you have a better idea of what to expect during delivery. You may have talked about your birth preferences with your doctor. But even if you want a vaginal birth, it is a good idea to learn about  births.  birth means that your baby is born through a cut (incision) in your lower belly. It is sometimes the best choice for the health of the baby and the mother. This care sheet can help you understand  births. It also gives you information about what to expect after your baby is born. And it helps you understand more about postpartum depression. Follow-up care is a key part of your treatment and safety. Be sure to make and go to all appointments, and call your doctor if you are having problems. It's also a good idea to know your test results and keep a list of the medicines you take. How can you care for yourself at home? Learn about  birth  · Most C-sections are unplanned. They are done because of problems that occur during labor. These problems might include:  ¨ Labor that slows or stops. ¨ High blood pressure or other problems for the mother. ¨ Signs of distress in the baby. These signs may include a very fast or slow heart rate. · Although most mothers and babies do well after , it is major surgery. It has more risks than a vaginal delivery.   · In some cases, a planned  may be safer than a vaginal delivery. This may be the case if:  ¨ The mother has a health problem, such as a heart condition. ¨ The baby isn't in a head-down position for delivery. This is called a breech position. ¨ The uterus has scars from past surgeries. This could increase the chance of a tear in the uterus. ¨ There is a problem with the placenta. ¨ The mother has an infection, such as genital herpes, that could be spread to the baby. ¨ The mother is having twins or more. ¨ The baby weighs 9 to 10 pounds or more. · Because of the risks of , planned C-sections generally should be done only for medical reasons. And a planned  should be done at 39 weeks or later unless there is a medical reason to do it sooner. Know what to expect after delivery, and plan for the first few weeks at home  · You, your baby, and your partner or  will get identification bands. Only people with matching bands can  the baby from the nursery. · You will learn how to feed, diaper, and bathe your baby. And you will learn how to care for the umbilical cord stump. If your baby will be circumcised, you will also learn how to care for that. · Ask people to wait to visit you until you are at home. And ask them to wash their hands before they touch your baby. · Make sure you have another adult in your home for at least 2 or 3 days after the birth. · During the first 2 weeks, limit when friends and family can visit. · Do not allow visitors who have colds or infections. Make sure all visitors are up to date with their vaccinations. Never let anyone smoke around your baby. · Try to nap when the baby naps. Be aware of postpartum depression  · \"Baby blues\" are common for the first 1 to 2 weeks after birth. You may cry or feel sad or irritable for no reason. · For some women, these feelings last longer and are more intense. This is called postpartum depression.   · If your symptoms last for more than a few weeks or you feel very depressed, ask your doctor for help. · Postpartum depression can be treated. Support groups and counseling can help. Sometimes medicine can also help. Where can you learn more? Go to http://ifeoma-pancho.info/. Enter B044 in the search box to learn more about \"Week 38 of Your Pregnancy: Care Instructions. \"  Current as of: 2017  Content Version: 11.7  © 7665-3966 [employer]. Care instructions adapted under license by [employer] (which disclaims liability or warranty for this information). If you have questions about a medical condition or this instruction, always ask your healthcare professional. [place] [address] any warranty or liability for your use of this information. Pregnancy Precautions: Care Instructions  Your Care Instructions    There is no sure way to prevent labor before your due date ( labor) or to prevent most other pregnancy problems. But there are things you can do to increase your chances of a healthy pregnancy. Go to your appointments, follow your doctor's advice, and take good care of yourself. Eat well, and exercise (if your doctor agrees). And make sure to drink plenty of water. Follow-up care is a key part of your treatment and safety. Be sure to make and go to all appointments, and call your doctor if you are having problems. It's also a good idea to know your test results and keep a list of the medicines you take. How can you care for yourself at home? · Make sure you go to your prenatal appointments. At each visit, your doctor will check your blood pressure. Your doctor will also check to see if you have protein in your urine. High blood pressure and protein in urine are signs of preeclampsia. This condition can be dangerous for you and your baby. · Drink plenty of fluids, enough so that your urine is light yellow or clear like water. Dehydration can cause contractions.  If you have kidney, heart, or liver disease and have to limit fluids, talk with your doctor before you increase the amount of fluids you drink. · Tell your doctor right away if you notice any symptoms of an infection, such as:  ¨ Burning when you urinate. ¨ A foul-smelling discharge from your vagina. ¨ Vaginal itching. ¨ Unexplained fever. ¨ Unusual pain or soreness in your uterus or lower belly. · Eat a balanced diet. Include plenty of foods that are high in calcium and iron. ¨ Foods high in calcium include milk, cheese, yogurt, almonds, and broccoli. ¨ Foods high in iron include red meat, shellfish, poultry, eggs, beans, raisins, whole-grain bread, and leafy green vegetables. · Do not smoke. If you need help quitting, talk to your doctor about stop-smoking programs and medicines. These can increase your chances of quitting for good. · Do not drink alcohol or use illegal drugs. · Follow your doctor's directions about activity. Your doctor will let you know how much, if any, exercise you can do. · Ask your doctor if you can have sex. If you are at risk for early labor, your doctor may ask you to not have sex. · Take care to prevent falls. During pregnancy, your joints are loose, and your balance is off. Sports such as bicycling, skiing, or in-line skating can increase your risk of falling. And don't ride horses or motorcycles, dive, water ski, scuba dive, or parachute jump while you are pregnant. · Avoid getting very hot. Do not use saunas or hot tubs. Avoid staying out in the sun in hot weather for long periods. Take acetaminophen (Tylenol) to lower a high fever. · Do not take any over-the-counter or herbal medicines or supplements without talking to your doctor or pharmacist first.  When should you call for help? Call 911 anytime you think you may need emergency care.  For example, call if:    · You passed out (lost consciousness).     · You have severe vaginal bleeding.     · You have severe pain in your belly or pelvis.     · You have had fluid gushing or leaking from your vagina and you know or think the umbilical cord is bulging into your vagina. If this happens, immediately get down on your knees so your rear end (buttocks) is higher than your head. This will decrease the pressure on the cord until help arrives.   [facility] your doctor now or seek immediate medical care if:    · You have signs of preeclampsia, such as:  ¨ Sudden swelling of your face, hands, or feet. ¨ New vision problems (such as dimness or blurring). ¨ A severe headache.     · You have any vaginal bleeding.     · You have belly pain or cramping.     · You have a fever.     · You have had regular contractions (with or without pain) for an hour. This means that you have 8 or more within 1 hour or 4 or more in 20 minutes after you change your position and drink fluids.     · You have a sudden release of fluid from your vagina.     · You have low back pain or pelvic pressure that does not go away.     · You notice that your baby has stopped moving or is moving much less than normal.    Watch closely for changes in your health, and be sure to contact your doctor if you have any problems. Where can you learn more? Go to http://ifeoma-pancho.info/. Enter [id number] in the search box to learn more about \"Pregnancy Precautions: Care Instructions. \"  Current as of: November 21, 2017  Content Version: 11.7  © 1008-4373 [employer] ([email or web address]). Care instructions adapted under license by [employer] (which disclaims liability or warranty for this information). If you have questions about a medical condition or this instruction, always ask your healthcare professional. [address] any warranty or liability for your use of this information.

## 2018-09-12 ENCOUNTER — HOSPITAL ENCOUNTER (EMERGENCY)
Age: 28
Discharge: HOME | End: 2018-09-12
Attending: OBSTETRICS & GYNECOLOGY | Admitting: OBSTETRICS & GYNECOLOGY
Payer: COMMERCIAL

## 2018-09-12 VITALS
HEIGHT: 63 IN | DIASTOLIC BLOOD PRESSURE: 63 MMHG | WEIGHT: 205 LBS | SYSTOLIC BLOOD PRESSURE: 111 MMHG | TEMPERATURE: 98 F | RESPIRATION RATE: 18 BRPM | HEART RATE: 81 BPM | BODY MASS INDEX: 36.32 KG/M2

## 2018-09-12 PROCEDURE — 59025 FETAL NON-STRESS TEST: CPT

## 2018-09-12 PROCEDURE — 96372 THER/PROPH/DIAG INJ SC/IM: CPT

## 2018-09-12 PROCEDURE — 99283 EMERGENCY DEPT VISIT LOW MDM: CPT

## 2018-09-12 PROCEDURE — 74011250636 HC RX REV CODE- 250/636: Performed by: OBSTETRICS & GYNECOLOGY

## 2018-09-12 RX ORDER — HYDROMORPHONE HYDROCHLORIDE 2 MG/ML
1 INJECTION, SOLUTION INTRAMUSCULAR; INTRAVENOUS; SUBCUTANEOUS ONCE
Status: COMPLETED | OUTPATIENT
Start: 2018-09-13 | End: 2018-09-12

## 2018-09-12 RX ORDER — PROMETHAZINE HYDROCHLORIDE 25 MG/ML
25 INJECTION, SOLUTION INTRAMUSCULAR; INTRAVENOUS
Status: COMPLETED | OUTPATIENT
Start: 2018-09-13 | End: 2018-09-12

## 2018-09-12 RX ADMIN — PROMETHAZINE HYDROCHLORIDE 25 MG: 25 INJECTION, SOLUTION INTRAMUSCULAR; INTRAVENOUS at 23:38

## 2018-09-12 RX ADMIN — HYDROMORPHONE HYDROCHLORIDE 1 MG: 2 INJECTION, SOLUTION INTRAMUSCULAR; INTRAVENOUS; SUBCUTANEOUS at 23:38

## 2018-09-12 NOTE — IP AVS SNAPSHOT
Summary of Care Report The Summary of Care report has been created to help improve care coordination. Users with access to [employer] or [address] (Web-based application) may access additional patient information including the Discharge Summary. If you are not currently a [address] user and need more information, please call the number listed below in the [place] [address] section and ask to be connected with Medical Records. Facility Information Name Address Phone [address] [place] [address] [address] [place] [id number] [phone] Patient Information Patient Name Sex  Crayton Kussmaul ([id number]) Female 1990 Discharge Information Admitting Provider Service Area Unit Austen Simms MD / [address] [facility] [address] / [phone] Discharge Provider Discharge Date/Time Discharge Disposition Destination (none) (none) (none) (none) Patient Language Language ENGLISH [13] Hospital Problems as of 2018  Reviewed: 2018 11:07 PM by Jaison Mac MD  
  
  
  
 Class Noted - Resolved Last Modified POA Active Problems Abdominal pain during pregnancy in third trimester  9/10/2018 - Present 2018 by Austen Simms MD Unknown Entered by Jayne Quintanilla MD  
  
Non-Hospital Problems as of 2018  Reviewed: 2018 11:07 PM by Jaison Mac MD  
  
  
  
 Class Noted - Resolved Last Modified Active Problems High-risk pregnancy in third trimester  2018 - Present 2018 by Jaison Mac MD  
  Entered by Jaison Mac MD  
  Overview Addendum 2018 11:30 PM by Jaison Mac MD  
   8 wk (2018):  10 m s/p gastric sleeve ([place]). Has lost 117 lb. LDA  from 12-16 wk.  Early glucola per pt doesn't vinod sugar, if not tolerated, alt screen: fasting and postprandial blood sugars for one week. Nutritional status, watch for IUGR 
+ sickle cell trait, FOB status:  pending:  ? urine cx q trimester:  1st trimester neg,  2nd trimester neg,  3rd trimester. No h/o HSV. 12 wk: Advised to start baby asa/stop 36 wk 
7/11/2018 at [facility]: Normal repeat echo. AC 40%, overall 54%, CHRISTINA 17cm, BPP 8/8, Dopplers WNL 35 wk:  Stopped baby aspirin a a few weeks ago. Pregnancy affected by previous bariatric surgery, currently in third trimester  3/14/2018 - Present 7/11/2018 by Teresa Florence MD  
  Entered by Mary Humphries RN Overview Addendum 7/11/2018 10:46 AM by Teresa Florence MD  
   3/14/2018 at [facility]:  Hx of Gastric Sleeve in 4/2017. Lost 117# 
5/21/2018 at [facility]: Patient having right sided pain and will have radiology appt 5/23/2018 for review of gallbladder. 7/11/2018 at [facility]:  Was given Rx for meter June 2018- pt states she has been testing blood sugars and they have been between . Glucose logs given to patient and encouraged to send to [facility] for us to review. · Patient to send logs next week; if >2 elevations during week of testing, then: · Refer to [facility]. · Then refer back to M for DM management to be seen in ~2-3 weeks. Obesity affecting pregnancy in third trimester  3/14/2018 - Present 7/11/2018 by Tiffany Gutierrez RN Entered by Mary Humphries RN Asthma affecting pregnancy, antepartum  3/14/2018 - Present 7/11/2018 by Tiffany Gutierrez RN Entered by Mary Humphries RN Overview Addendum 7/11/2018 10:30 AM by Tiffany Gutierrez RN  
   3/14/2018 at [facility]:  Stable on Albuterol prn. Pt states only has flairs when \"has a cold\". 4/9/2018 at [facility]:  Stable on Albuterol prn.  
7/11/2018 at [facility]:  stable · Continue Albuterol prn.  
  
  
  Maternal sickle cell anemia complicating pregnancy in third trimester ([address]  3/14/2018 - Present 8/13/2018 by Tatyana Valdez MD  
 Entered by Dez Russo RN Overview Addendum 8/13/2018  9:32 AM by Kwabena Hancock MD  
   3/14/2018 at [facility]:  Pt with Sickle Cell Trait. FOB states negative. 3/19/2018:  FOB, Shant Harvey, is negative carrier. Tested 3/13/18. 
7/11/2018 at [facility]:  Patient is at increased risk for asymptomatic bacturia and UTI. Needs screening for UTI with UA and culture at next OB visit 8/8/18 bactiuria  Enterobacter cloacae -10,000-25,000 colony forming units per ml (7/25/18 txd E coli uti). Macrobid bid x 7 d then qd suppression Thickening of nuchal fold  3/14/2018 - Present 7/11/2018 by Fatuma Campbell MD  
  Entered by Dez Russo RN Overview Addendum 7/11/2018 10:47 AM by Fatuma Campbell MD  
   3/14/2018 at [facility]:  NT upper normal limits at 2.7 mm extending to mid back, no septations, not >2 SD above normal but appearance raises clinical suspicion, NB present. Genetic counseling done by physician today. Pt wants NIPT, to primary OB for redraw on 3/20/2018---> low risk Panorama 4/9/2018 at [facility]:  Normal early Anatomy/Fetal Echo. This is reassuring with a negative NIPS. Questions answered. Would like to reevaluate anatomy and echo in 6 weeks. 16 + wk:  declines MSAFP. 
5/21/2018 at [facility]: Appropriate fetal growth without IUGR, Normal fetal echo, reassuring fetal status. No aneuploidy markers seen. %, overall 53%, CHRISTINA  19.5cm, UA Dopplers WNL. Patient and  reassured. Small risk of cardiac defect. 7/11/2018 [facility]: reassuring fetal status. Gastroesophageal reflux during pregnancy, antepartum, third trimester  7/11/2018 - Present 7/11/2018 by Fatuma Campbell MD  
  Entered by Farhan Negrete RN Overview Addendum 7/11/2018 10:39 AM by Farhan Negrete RN  
    
7/11/2018 at [facility]: Pt with increase in nausea and reflux. Using Prilosec daily and states it hasn't been working well.  
· Script sent for to add zantac BID 
  
  
 GBS bacteriuria  2/7/2018 - Present 8/29/2018 by Hawa Kirby MD  
  Entered by Hawa Kirby MD  
  Overview Signed 8/29/2018 11:39 PM by Hawa Kirby MD  
   Needs GBS PROPHYLAXIS during labor You are allergic to the following No active allergies Current Discharge Medication List  
  
ASK your doctor about these medications Dose & Instructions Dispensing Information Comments  
 albuterol 5 mg/mL nebulizer solution Commonly known as:  PROVENTIL  
 by Nebulization route once. Refills:  0 Blood-Glucose Meter monitoring kit Check blood sugar before first meal, an hour after each meal and at bedtime Quantity:  1 Kit Refills:  0  
   
 CALCIUM 500 PO Dose:  1000 mg Take 1,000 mg by mouth. Refills:  0  
   
 glucose blood VI test strips strip Commonly known as:  ASCENSIA AUTODISC VI, ONE TOUCH ULTRA TEST VI Check blood sugar before first meal, an hour after each meal and at bedtime Quantity:  200 Strip Refills:  3 Lancets Misc Check blood sugar before first meal, an hour after each meal and at bedtime Quantity:  1 Each Refills:  11  
   
 nitrofurantoin (macrocrystal-monohydrate) 100 mg capsule Commonly known as:  MACROBID Take one tab twice a day for ten days, then one a day until delivery. Quantity:  60 Cap Refills:  0  
   
 omeprazole 20 mg capsule Commonly known as:  PRILOSEC Dose:  20 mg Take 20 mg by mouth daily. Refills:  0  
   
 ondansetron hcl 8 mg tablet Commonly known as:  [place] Manuel Dose:  8 mg Take 1 Tab by mouth every eight (8) hours as needed for Nausea. Quantity:  30 Tab Refills:  3 PNV66-Iron Fumarate-FA-DSS-DHA 27-1. 25- mg Cap Commonly known as:  PNV-DHA + DOCUSATE Dose:  1 Cap Take 1 Cap by mouth daily. Which ever generic PNV/DHA insurance will cover  Indications: pregnancy Quantity:  90 Cap Refills:  3  
   
 raNITIdine 150 mg tablet Commonly known as:  ZANTAC Dose:  150 mg Take 1 Tab by mouth two (2) times a day. Indications: gastroesophageal reflux disease, Heartburn Quantity:  60 Tab Refills:  5  
   
 TYLENOL 325 mg tablet Generic drug:  acetaminophen Take  by mouth every four (4) hours as needed for Pain. Refills:  0  
   
 VITAMIN D3 2,000 unit Tab Generic drug:  cholecalciferol (vitamin D3) Take  by mouth. Refills:  0 Follow-up Information None Discharge Instructions Continue to follow up with next OB appointment. Week 38 of Your Pregnancy: Care Instructions Your Care Instructions Believe it or not, your baby is almost here. You may have ideas about your baby's personality because of how much he or she moves. Or you may have noticed how he or she responds to sounds, warmth, cold, and light. You may even know what kind of music your baby likes. By now, you have a better idea of what to expect during delivery. You may have talked about your birth preferences with your doctor. But even if you want a vaginal birth, it is a good idea to learn about  births.  birth means that your baby is born through a cut (incision) in your lower belly. It is sometimes the best choice for the health of the baby and the mother. This care sheet can help you understand  births. It also gives you information about what to expect after your baby is born. And it helps you understand more about postpartum depression. Follow-up care is a key part of your treatment and safety. Be sure to make and go to all appointments, and call your doctor if you are having problems. It's also a good idea to know your test results and keep a list of the medicines you take. How can you care for yourself at home? Learn about  birth · Most C-sections are unplanned. They are done because of problems that occur during labor. These problems might include: ¨ Labor that slows or stops. ¨ High blood pressure or other problems for the mother. ¨ Signs of distress in the baby. These signs may include a very fast or slow heart rate. · Although most mothers and babies do well after , it is major surgery. It has more risks than a vaginal delivery. · In some cases, a planned  may be safer than a vaginal delivery. This may be the case if: ¨ The mother has a health problem, such as a heart condition. ¨ The baby isn't in a head-down position for delivery. This is called a breech position. ¨ The uterus has scars from past surgeries. This could increase the chance of a tear in the uterus. ¨ There is a problem with the placenta. ¨ The mother has an infection, such as genital herpes, that could be spread to the baby. ¨ The mother is having twins or more. ¨ The baby weighs 9 to 10 pounds or more. · Because of the risks of , planned C-sections generally should be done only for medical reasons. And a planned  should be done at 39 weeks or later unless there is a medical reason to do it sooner. Know what to expect after delivery, and plan for the first few weeks at home · You, your baby, and your partner or  will get identification bands. Only people with matching bands can  the baby from the nursery. · You will learn how to feed, diaper, and bathe your baby. And you will learn how to care for the umbilical cord stump. If your baby will be circumcised, you will also learn how to care for that. · Ask people to wait to visit you until you are at home. And ask them to wash their hands before they touch your baby. · Make sure you have another adult in your home for at least 2 or 3 days after the birth. · During the first 2 weeks, limit when friends and family can visit. · Do not allow visitors who have colds or infections. Make sure all visitors are up to date with their vaccinations. Never let anyone smoke around your baby. · Try to nap when the baby naps. Be aware of postpartum depression · \"Baby blues\" are common for the first 1 to 2 weeks after birth. You may cry or feel sad or irritable for no reason. · For some women, these feelings last longer and are more intense. This is called postpartum depression. · If your symptoms last for more than a few weeks or you feel very depressed, ask your doctor for help. · Postpartum depression can be treated. Support groups and counseling can help. Sometimes medicine can also help. Where can you learn more? Go to http://ifeoma-pancho.info/. Enter B044 in the search box to learn more about \"Week 38 of Your Pregnancy: Care Instructions. \" Current as of: November 21, 2017 Content Version: 11.7 © 0207-2840 [employer]. Care instructions adapted under license by [employer] (which disclaims liability or warranty for this information). If you have questions about a medical condition or this instruction, always ask your healthcare professional. [place] [address] any warranty or liability for your use of this information. Early Stage of Labor at Home: Care Instructions Your Care Instructions If you came to the hospital while in early labor, your doctor may have asked if you want to labor at home until your contractions are stronger. Many women stay at home during early labor. This is often the longest part of the birthing process. It may last up to 2 to 3 days. Contractions are mild to moderate and shorter (about 30 to 45 seconds). You can usually keep talking during them. Contractions may also be irregular, about 5 to 20 minutes apart. They may even stop for a while. It helps to stay as relaxed as you can during this time. You can spend some or all of your early labor at home or anywhere else you may be comfortable.  If you live far from the hospital or birthing center, you may want to think about going somewhere nearby so you can get back to the hospital quickly. For some women, there may be benefits to staying home during early labor, such as avoiding medicines or procedures. As labor progresses, you'll shift from early labor to active labor. During this time, contractions get more intense. They occur more often, about every 2 to 3 minutes. They also last longer, about 50 to 70 seconds. You will feel them even when you change positions and walk or move around. It may be hard to tell if you are in active labor. If you aren't sure, call your doctor or midwife. As your labor progresses, check in with your doctor or midwife about when to come back to the hospital or birthing center. You may have special instructions if your water broke or you tested positive for group B strep. Follow-up care is a key part of your treatment and safety. Be sure to make and go to all appointments, and call your doctor if you are having problems. It's also a good idea to know your test results and keep a list of the medicines you take. How can you care for yourself at home? · Get support. Having a support person with you from early labor until after childbirth can have a positive effect on childbirth. · Find distractions. During early labor, you can walk, play cards, watch TV, or listen to music to help take your mind off your contractions. · Ask your partner, labor , or  for a massage. Shoulder and low back massage during contractions may ease your pain. Strong massage of the back muscles (counterpressure) during contractions may help relieve the pain of back labor. Tell your labor  exactly where to push and how hard to push. · Use imagery. This means using your imagination to decrease your pain. For instance, to help manage pain, picture your contractions as waves rolling over you. Picture a peaceful place, such as a beach or mountain stream, to help you relax between contractions. · Change positions during labor. Walking, kneeling, or sitting on a big rubber ball (birth ball) are good options. · Use focused breathing techniques. Breathing in a rhythm can distract you from pain. · Take a warm shower or bath. Warm water may ease pain and stress. When should you call for help? Call 911 anytime you think you may need emergency care. For example, call if: 
  · You passed out (lost consciousness).  
  · You have severe vaginal bleeding.  
  · You have severe pain in your belly or pelvis.  
  · You have had fluid gushing or leaking from your vagina and you know or think the umbilical cord is bulging into your vagina. If this happens, immediately get down on your knees so your rear end (buttocks) is higher than your head. This will decrease the pressure on the cord until help arrives.  
[facility] your doctor now or seek immediate medical care if: 
  · You have new or worse signs of preeclampsia, such as: 
¨ Sudden swelling of your face, hands, or feet. ¨ New vision problems (such as dimness or blurring). ¨ A severe headache.  
  · You have any vaginal bleeding.  
  · You have belly pain or cramping.  
  · You have a fever.  
  · You have had regular contractions (with or without pain) for an hour. This means that you have 8 or more within 1 hour or 4 or more in 20 minutes after you change your position and drink fluids.  
  · You have a sudden release of fluid from your vagina.  
  · You have low back pain or pelvic pressure that does not go away.  
  · You notice that your baby has stopped moving or is moving much less than normal.  
 Watch closely for changes in your health, and be sure to contact your doctor if you have any problems. Where can you learn more? Go to http://ifeoma-pancho.info/. Enter Z490 in the search box to learn more about \"Early Stage of Labor at Home: Care Instructions. \" Current as of: November 21, 2017 Content Version: 11.7 © 4838-0712 [employer], [employer]. Care instructions adapted under license by [employer] (which disclaims liability or warranty for this information). If you have questions about a medical condition or this instruction, always ask your healthcare professional. [address] any warranty or liability for your use of this information. Counting Your Baby's Kicks: Care Instructions Your Care Instructions Counting your baby's kicks is one way your doctor can tell that your baby is healthy. Most women-especially in a first pregnancy-feel their baby move for the first time between 16 and 22 weeks. The movement may feel like flutters rather than kicks. Your baby may move more at certain times of the day. When you are active, you may notice less kicking than when you are resting. At your prenatal visits, your doctor will ask whether the baby is active. In your last trimester, your doctor may ask you to count the number of times you feel your baby move. Follow-up care is a key part of your treatment and safety. Be sure to make and go to all appointments, and call your doctor if you are having problems. It's also a good idea to know your test results and keep a list of the medicines you take. How do you count fetal kicks? · A common method of checking your baby's movement is to count the number of kicks or moves you feel in 1 hour. Ten movements (such as kicks, flutters, or rolls) in 1 hour are normal. Some doctors suggest that you count in the morning until you get to 10 movements. Then you can quit for that day and start again the next day. · Pick your baby's most active time of day to count. This may be any time from morning to evening. · If you do not feel 10 movements in an hour, your baby may be sleeping. Wait for the next hour and count again. When should you call for help? Call your doctor now or seek immediate medical care if:   · You noticed that your baby has stopped moving or is moving much less than normal.  
 Watch closely for changes in your health, and be sure to contact your doctor if you have any problems. Where can you learn more? Go to http://ifeoma-pancho.info/. Enter J228 in the search box to learn more about \"Counting Your Baby's Kicks: Care Instructions. \" Current as of: November 21, 2017 Content Version: 11.7 © 9687-4513 [employer]. Care instructions adapted under license by [employer] (which disclaims liability or warranty for this information). If you have questions about a medical condition or this instruction, always ask your healthcare professional. [address] any warranty or liability for your use of this information. Horacio Ades Contractions: Care Instructions Your Care Instructions Nilay Henry contractions prepare your uterus for labor. Think of them as a \"warm-up\" exercise that your body does. You may begin to feel them between the 28th and 30th weeks of your pregnancy. But they start as early as the 20th week. [place] Henry contractions usually occur more often during the ninth month. They may go away when you are active and return when you rest. These contractions are like mild contractions of true labor, but they occur less often. (You feel fewer than 8 in an hour.) They don't cause your cervix to open. It may be hard for you to tell the difference between Horacio Ades contractions and true labor, especially in your first pregnancy. Follow-up care is a key part of your treatment and safety. Be sure to make and go to all appointments, and call your doctor if you are having problems. It's also a good idea to know your test results and keep a list of the medicines you take. How can you care for yourself at home? · Try a warm bath to help relieve muscle tension and reduce pain. · Change positions every 30 minutes. Take breaks if you must sit for a long time. Get up and walk around. · Drink plenty of water, enough so that your urine is light yellow or clear like water. · Taking short walks may help you feel better. Your doctor needs to check any contractions that are getting stronger or closer together. Where can you learn more? Go to http://ifeoma-pancho.info/. Enter 272 [id number] in the search box to learn more about \"[place] Contractions: Care Instructions. \" Current as of: 2017 Content Version: 11.7 © 6620-6636 [employer]. Care instructions adapted under license by [employer] (which disclaims liability or warranty for this information). If you have questions about a medical condition or this instruction, always ask your healthcare professional. [address] any warranty or liability for your use of this information. Pregnancy Precautions: Care Instructions Your Care Instructions There is no sure way to prevent labor before your due date ( labor) or to prevent most other pregnancy problems. But there are things you can do to increase your chances of a healthy pregnancy. Go to your appointments, follow your doctor's advice, and take good care of yourself. Eat well, and exercise (if your doctor agrees). And make sure to drink plenty of water. Follow-up care is a key part of your treatment and safety. Be sure to make and go to all appointments, and call your doctor if you are having problems. It's also a good idea to know your test results and keep a list of the medicines you take. How can you care for yourself at home? · Make sure you go to your prenatal appointments. At each visit, your doctor will check your blood pressure. Your doctor will also check to see if you have protein in your urine.  High blood pressure and protein in urine are signs of preeclampsia. This condition can be dangerous for you and your baby. · Drink plenty of fluids, enough so that your urine is light yellow or clear like water. Dehydration can cause contractions. If you have kidney, heart, or liver disease and have to limit fluids, talk with your doctor before you increase the amount of fluids you drink. · Tell your doctor right away if you notice any symptoms of an infection, such as: ¨ Burning when you urinate. ¨ A foul-smelling discharge from your vagina. ¨ Vaginal itching. ¨ Unexplained fever. ¨ Unusual pain or soreness in your uterus or lower belly. · Eat a balanced diet. Include plenty of foods that are high in calcium and iron. ¨ Foods high in calcium include milk, cheese, yogurt, almonds, and broccoli. ¨ Foods high in iron include red meat, shellfish, poultry, eggs, beans, raisins, whole-grain bread, and leafy green vegetables. · Do not smoke. If you need help quitting, talk to your doctor about stop-smoking programs and medicines. These can increase your chances of quitting for good. · Do not drink alcohol or use illegal drugs. · Follow your doctor's directions about activity. Your doctor will let you know how much, if any, exercise you can do. · Ask your doctor if you can have sex. If you are at risk for early labor, your doctor may ask you to not have sex. · Take care to prevent falls. During pregnancy, your joints are loose, and your balance is off. Sports such as bicycling, skiing, or in-line skating can increase your risk of falling. And don't ride horses or motorcycles, dive, water ski, scuba dive, or parachute jump while you are pregnant. · Avoid getting very hot. Do not use saunas or hot tubs. Avoid staying out in the sun in hot weather for long periods. Take acetaminophen (Tylenol) to lower a high fever.  
· Do not take any over-the-counter or herbal medicines or supplements without talking to your doctor or pharmacist first. 
 When should you call for help? Call 911 anytime you think you may need emergency care. For example, call if: 
  · You passed out (lost consciousness).  
  · You have severe vaginal bleeding.  
  · You have severe pain in your belly or pelvis.  
  · You have had fluid gushing or leaking from your vagina and you know or think the umbilical cord is bulging into your vagina. If this happens, immediately get down on your knees so your rear end (buttocks) is higher than your head. This will decrease the pressure on the cord until help arrives.  
[facility] your doctor now or seek immediate medical care if: 
  · You have signs of preeclampsia, such as: 
¨ Sudden swelling of your face, hands, or feet. ¨ New vision problems (such as dimness or blurring). ¨ A severe headache.  
  · You have any vaginal bleeding.  
  · You have belly pain or cramping.  
  · You have a fever.  
  · You have had regular contractions (with or without pain) for an hour. This means that you have 8 or more within 1 hour or 4 or more in 20 minutes after you change your position and drink fluids.  
  · You have a sudden release of fluid from your vagina.  
  · You have low back pain or pelvic pressure that does not go away.  
  · You notice that your baby has stopped moving or is moving much less than normal.  
 Watch closely for changes in your health, and be sure to contact your doctor if you have any problems. Where can you learn more? Go to http://ifeoma-pancho.info/. Enter [id number] in the search box to learn more about \"Pregnancy Precautions: Care Instructions. \" Current as of: November 21, 2017 Content Version: 11.7 © 7623-1038 [employer]. Care instructions adapted under license by [employer] (which disclaims liability or warranty for this information).  If you have questions about a medical condition or this instruction, always ask your healthcare professional. [employer] disclaims any warranty or liability for your use of this information. Chart Review Routing History No Routing History on File

## 2018-09-12 NOTE — IP AVS SNAPSHOT
[address] [place]  
[phone] Patient: Ruslan Brown MRN: [id number] A check alyssa indicates which time of day the medication should be taken. My Medications ASK your doctor about these medications Instructions Each Dose to Equal  
 Morning Noon Evening Bedtime  
 albuterol 5 mg/mL nebulizer solution Commonly known as:  PROVENTIL Your last dose was: Your next dose is:    
   
   
 by Nebulization route once. Blood-Glucose Meter monitoring kit Your last dose was: Your next dose is:    
   
   
 Check blood sugar before first meal, an hour after each meal and at bedtime CALCIUM 500 PO Your last dose was: Your next dose is: Take 1,000 mg by mouth. 1000 mg  
    
   
   
   
  
 glucose blood VI test strips strip Commonly known as:  ASCENSIA AUTODISC VI, ONE TOUCH ULTRA TEST VI Your last dose was: Your next dose is:    
   
   
 Check blood sugar before first meal, an hour after each meal and at bedtime Lancets Misc Your last dose was: Your next dose is:    
   
   
 Check blood sugar before first meal, an hour after each meal and at bedtime  
     
   
   
   
  
 nitrofurantoin (macrocrystal-monohydrate) 100 mg capsule Commonly known as:  MACROBID Your last dose was: Your next dose is: Take one tab twice a day for ten days, then one a day until delivery. omeprazole 20 mg capsule Commonly known as:  PRILOSEC Your last dose was: Your next dose is: Take 20 mg by mouth daily. 20 mg  
    
   
   
   
  
 ondansetron hcl 8 mg tablet Commonly known as:  Abby Yue Your last dose was: Your next dose is: Take 1 Tab by mouth every eight (8) hours as needed for Nausea. 8 mg PNV66-Iron Fumarate-FA-DSS-DHA 27-1. 25- mg Cap Commonly known as:  PNV-DHA + DOCUSATE Your last dose was: Your next dose is: Take 1 Cap by mouth daily. Which ever generic PNV/DHA insurance will cover  Indications: pregnancy 1 Cap  
    
   
   
   
  
 raNITIdine 150 mg tablet Commonly known as:  ZANTAC Your last dose was: Your next dose is: Take 1 Tab by mouth two (2) times a day. Indications: gastroesophageal reflux disease, Heartburn 150 mg  
    
   
   
   
  
 TYLENOL 325 mg tablet Generic drug:  acetaminophen Your last dose was: Your next dose is: Take  by mouth every four (4) hours as needed for Pain. VITAMIN D3 2,000 unit Tab Generic drug:  cholecalciferol (vitamin D3) Your last dose was: Your next dose is: Take  by mouth.

## 2018-09-12 NOTE — IP AVS SNAPSHOT
[address] 
[phone] Patient: Noella Hamman MRN: [id number] [id number] About your hospitalization You were admitted on:  N/A You last received care in the:  [facility] 4 ERIC You were discharged on:  September 12, 2018 Why you were hospitalized Your primary diagnosis was:  Not on File Your diagnoses also included:  Abdominal Pain During Pregnancy In Third Trimester Follow-up Information None Your Scheduled Appointments Tuesday September 18, 2018  8:45 AM EDT  
(Arrive by 8:30 AM) Return OB with Isabel Jacobo, [facility] ([facility]) [address] [address] [phone] Discharge Orders None A check alyssa indicates which time of day the medication should be taken. My Medications ASK your doctor about these medications Instructions Each Dose to Equal  
 Morning Noon Evening Bedtime  
 albuterol 5 mg/mL nebulizer solution Commonly known as:  PROVENTIL Your last dose was: Your next dose is:    
   
   
 by Nebulization route once. Blood-Glucose Meter monitoring kit Your last dose was: Your next dose is:    
   
   
 Check blood sugar before first meal, an hour after each meal and at bedtime CALCIUM 500 PO Your last dose was: Your next dose is: Take 1,000 mg by mouth. 1000 mg  
    
   
   
   
  
 glucose blood VI test strips strip Commonly known as:  ASCENSIA AUTODISC VI, ONE TOUCH ULTRA TEST VI Your last dose was: Your next dose is:    
   
   
 Check blood sugar before first meal, an hour after each meal and at bedtime Lancets Misc Your last dose was: Your next dose is: Check blood sugar before first meal, an hour after each meal and at bedtime  
     
   
   
   
  
 nitrofurantoin (macrocrystal-monohydrate) 100 mg capsule Commonly known as:  MACROBID Your last dose was: Your next dose is: Take one tab twice a day for ten days, then one a day until delivery. omeprazole 20 mg capsule Commonly known as:  PRILOSEC Your last dose was: Your next dose is: Take 20 mg by mouth daily. 20 mg  
    
   
   
   
  
 ondansetron hcl 8 mg tablet Commonly known as:  Vinicius Even Your last dose was: Your next dose is: Take 1 Tab by mouth every eight (8) hours as needed for Nausea. 8 mg PNV66-Iron Fumarate-FA-DSS-DHA 27-1. 25- mg Cap Commonly known as:  PNV-DHA + DOCUSATE Your last dose was: Your next dose is: Take 1 Cap by mouth daily. Which ever generic PNV/DHA insurance will cover  Indications: pregnancy 1 Cap  
    
   
   
   
  
 raNITIdine 150 mg tablet Commonly known as:  ZANTAC Your last dose was: Your next dose is: Take 1 Tab by mouth two (2) times a day. Indications: gastroesophageal reflux disease, Heartburn 150 mg  
    
   
   
   
  
 TYLENOL 325 mg tablet Generic drug:  acetaminophen Your last dose was: Your next dose is: Take  by mouth every four (4) hours as needed for Pain. VITAMIN D3 2,000 unit Tab Generic drug:  cholecalciferol (vitamin D3) Your last dose was: Your next dose is: Take  by mouth. Discharge Instructions Continue to follow up with next OB appointment. Week 38 of Your Pregnancy: Care Instructions Your Care Instructions Believe it or not, your baby is almost here.  You may have ideas about your baby's personality because of how much he or she moves. Or you may have noticed how he or she responds to sounds, warmth, cold, and light. You may even know what kind of music your baby likes. By now, you have a better idea of what to expect during delivery. You may have talked about your birth preferences with your doctor. But even if you want a vaginal birth, it is a good idea to learn about  births.  birth means that your baby is born through a cut (incision) in your lower belly. It is sometimes the best choice for the health of the baby and the mother. This care sheet can help you understand  births. It also gives you information about what to expect after your baby is born. And it helps you understand more about postpartum depression. Follow-up care is a key part of your treatment and safety. Be sure to make and go to all appointments, and call your doctor if you are having problems. It's also a good idea to know your test results and keep a list of the medicines you take. How can you care for yourself at home? Learn about  birth · Most C-sections are unplanned. They are done because of problems that occur during labor. These problems might include: 
¨ Labor that slows or stops. ¨ High blood pressure or other problems for the mother. ¨ Signs of distress in the baby. These signs may include a very fast or slow heart rate. · Although most mothers and babies do well after , it is major surgery. It has more risks than a vaginal delivery. · In some cases, a planned  may be safer than a vaginal delivery. This may be the case if: ¨ The mother has a health problem, such as a heart condition. ¨ The baby isn't in a head-down position for delivery. This is called a breech position. ¨ The uterus has scars from past surgeries. This could increase the chance of a tear in the uterus. ¨ There is a problem with the placenta. ¨ The mother has an infection, such as genital herpes, that could be spread to the baby. ¨ The mother is having twins or more. ¨ The baby weighs 9 to 10 pounds or more. · Because of the risks of , planned C-sections generally should be done only for medical reasons. And a planned  should be done at 39 weeks or later unless there is a medical reason to do it sooner. Know what to expect after delivery, and plan for the first few weeks at home · You, your baby, and your partner or  will get identification bands. Only people with matching bands can  the baby from the nursery. · You will learn how to feed, diaper, and bathe your baby. And you will learn how to care for the umbilical cord stump. If your baby will be circumcised, you will also learn how to care for that. · Ask people to wait to visit you until you are at home. And ask them to wash their hands before they touch your baby. · Make sure you have another adult in your home for at least 2 or 3 days after the birth. · During the first 2 weeks, limit when friends and family can visit. · Do not allow visitors who have colds or infections. Make sure all visitors are up to date with their vaccinations. Never let anyone smoke around your baby. · Try to nap when the baby naps. Be aware of postpartum depression · \"Baby blues\" are common for the first 1 to 2 weeks after birth. You may cry or feel sad or irritable for no reason. · For some women, these feelings last longer and are more intense. This is called postpartum depression. · If your symptoms last for more than a few weeks or you feel very depressed, ask your doctor for help. · Postpartum depression can be treated. Support groups and counseling can help. Sometimes medicine can also help. Where can you learn more? Go to http://ifeoma-pancho.info/. Enter B044 in the search box to learn more about \"Week 38 of Your Pregnancy: Care Instructions. \" 
 Current as of: November 21, 2017 Content Version: 11.7 © 8079-7506 [email or web address]. Care instructions adapted under license by [employer] (which disclaims liability or warranty for this information). If you have questions about a medical condition or this instruction, always ask your healthcare professional. [address] any warranty or liability for your use of this information. Early Stage of Labor at Home: Care Instructions Your Care Instructions If you came to the hospital while in early labor, your doctor may have asked if you want to labor at home until your contractions are stronger. Many women stay at home during early labor. This is often the longest part of the birthing process. It may last up to 2 to 3 days. Contractions are mild to moderate and shorter (about 30 to 45 seconds). You can usually keep talking during them. Contractions may also be irregular, about 5 to 20 minutes apart. They may even stop for a while. It helps to stay as relaxed as you can during this time. You can spend some or all of your early labor at home or anywhere else you may be comfortable. If you live far from the hospital or birthing center, you may want to think about going somewhere nearby so you can get back to the hospital quickly. For some women, there may be benefits to staying home during early labor, such as avoiding medicines or procedures. As labor progresses, you'll shift from early labor to active labor. During this time, contractions get more intense. They occur more often, about every 2 to 3 minutes. They also last longer, about 50 to 70 seconds. You will feel them even when you change positions and walk or move around. It may be hard to tell if you are in active labor. If you aren't sure, call your doctor or midwife.  As your labor progresses, check in with your doctor or midwife about when to come back to the hospital or birthing center. You may have special instructions if your water broke or you tested positive for group B strep. Follow-up care is a key part of your treatment and safety. Be sure to make and go to all appointments, and call your doctor if you are having problems. It's also a good idea to know your test results and keep a list of the medicines you take. How can you care for yourself at home? · Get support. Having a support person with you from early labor until after childbirth can have a positive effect on childbirth. · Find distractions. During early labor, you can walk, play cards, watch TV, or listen to music to help take your mind off your contractions. · Ask your partner, labor , or  for a massage. Shoulder and low back massage during contractions may ease your pain. Strong massage of the back muscles (counterpressure) during contractions may help relieve the pain of back labor. Tell your labor  exactly where to push and how hard to push. · Use imagery. This means using your imagination to decrease your pain. For instance, to help manage pain, picture your contractions as waves rolling over you. Picture a peaceful place, such as a beach or mountain stream, to help you relax between contractions. · Change positions during labor. Walking, kneeling, or sitting on a big rubber ball (birth ball) are good options. · Use focused breathing techniques. Breathing in a rhythm can distract you from pain. · Take a warm shower or bath. Warm water may ease pain and stress. When should you call for help? Call 911 anytime you think you may need emergency care. For example, call if: 
  · You passed out (lost consciousness).  
  · You have severe vaginal bleeding.  
  · You have severe pain in your belly or pelvis.  
  · You have had fluid gushing or leaking from your vagina and you know or think the umbilical cord is bulging into your vagina.  If this happens, immediately get down on your knees so your rear end (buttocks) is higher than your head. This will decrease the pressure on the cord until help arrives.  
[facility] your doctor now or seek immediate medical care if: 
  · You have new or worse signs of preeclampsia, such as: 
¨ Sudden swelling of your face, hands, or feet. ¨ New vision problems (such as dimness or blurring). ¨ A severe headache.  
  · You have any vaginal bleeding.  
  · You have belly pain or cramping.  
  · You have a fever.  
  · You have had regular contractions (with or without pain) for an hour. This means that you have 8 or more within 1 hour or 4 or more in 20 minutes after you change your position and drink fluids.  
  · You have a sudden release of fluid from your vagina.  
  · You have low back pain or pelvic pressure that does not go away.  
  · You notice that your baby has stopped moving or is moving much less than normal.  
 Watch closely for changes in your health, and be sure to contact your doctor if you have any problems. Where can you learn more? Go to http://ifeoma-pancho.info/. Enter L661 in the search box to learn more about \"Early Stage of Labor at Home: Care Instructions. \" Current as of: November 21, 2017 Content Version: 11.7 © 1322-4137 Healthwise, Incorporated. Care instructions adapted under license by [employer] (which disclaims liability or warranty for this information). If you have questions about a medical condition or this instruction, always ask your healthcare professional. [place] [address] any warranty or liability for your use of this information. Counting Your Baby's Kicks: Care Instructions Your Care Instructions Counting your baby's kicks is one way your doctor can tell that your baby is healthy. Most women-especially in a first pregnancy-feel their baby move for the first time between 16 and 22 weeks.  The movement may feel like flutters rather than kicks. Your baby may move more at certain times of the day. When you are active, you may notice less kicking than when you are resting. At your prenatal visits, your doctor will ask whether the baby is active. In your last trimester, your doctor may ask you to count the number of times you feel your baby move. Follow-up care is a key part of your treatment and safety. Be sure to make and go to all appointments, and call your doctor if you are having problems. It's also a good idea to know your test results and keep a list of the medicines you take. How do you count fetal kicks? · A common method of checking your baby's movement is to count the number of kicks or moves you feel in 1 hour. Ten movements (such as kicks, flutters, or rolls) in 1 hour are normal. Some doctors suggest that you count in the morning until you get to 10 movements. Then you can quit for that day and start again the next day. · Pick your baby's most active time of day to count. This may be any time from morning to evening. · If you do not feel 10 movements in an hour, your baby may be sleeping. Wait for the next hour and count again. When should you call for help? Call your doctor now or seek immediate medical care if: 
  · You noticed that your baby has stopped moving or is moving much less than normal.  
 Watch closely for changes in your health, and be sure to contact your doctor if you have any problems. Where can you learn more? Go to http://ifeoma-pancho.info/. Enter H170 in the search box to learn more about \"Counting Your Baby's Kicks: Care Instructions. \" Current as of: November 21, 2017 Content Version: 11.7 © 9676-5478 [employer]. Care instructions adapted under license by "[employer]." (which disclaims liability or warranty for this information).  If you have questions about a medical condition or this instruction, always ask your healthcare professional. [place] [address] any warranty or liability for your use of this information. Dory Gardiner Contractions: Care Instructions Your Care Instructions [employer] [place] contractions prepare your uterus for labor. Think of them as a \"warm-up\" exercise that your body does. You may begin to feel them between the 28th and 30th weeks of your pregnancy. But they start as early as the 20th week. [employer] contractions usually occur more often during the ninth month. They may go away when you are active and return when you rest. These contractions are like mild contractions of true labor, but they occur less often. (You feel fewer than 8 in an hour.) They don't cause your cervix to open. It may be hard for you to tell the difference between D[place] Gardiner contractions and true labor, especially in your first pregnancy. Follow-up care is a key part of your treatment and safety. Be sure to make and go to all appointments, and call your doctor if you are having problems. It's also a good idea to know your test results and keep a list of the medicines you take. How can you care for yourself at home? · Try a warm bath to help relieve muscle tension and reduce pain. · Change positions every 30 minutes. Take breaks if you must sit for a long time. Get up and walk around. · Drink plenty of water, enough so that your urine is light yellow or clear like water. · Taking short walks may help you feel better. Your doctor needs to check any contractions that are getting stronger or closer together. Where can you learn more? Go to http://ifeoma-pancho.info/. Enter [id number] in the search box to learn more about \"[employer] Contractions: Care Instructions. \" Current as of: November 21, 2017 Content Version: 11.7 © 9171-7905 NuFlick, Incorporated.  Care instructions adapted under license by [address] (which disclaims liability or warranty for this information). If you have questions about a medical condition or this instruction, always ask your healthcare professional. [address] any warranty or liability for your use of this information. Pregnancy Precautions: Care Instructions Your Care Instructions There is no sure way to prevent labor before your due date ( labor) or to prevent most other pregnancy problems. But there are things you can do to increase your chances of a healthy pregnancy. Go to your appointments, follow your doctor's advice, and take good care of yourself. Eat well, and exercise (if your doctor agrees). And make sure to drink plenty of water. Follow-up care is a key part of your treatment and safety. Be sure to make and go to all appointments, and call your doctor if you are having problems. It's also a good idea to know your test results and keep a list of the medicines you take. How can you care for yourself at home? · Make sure you go to your prenatal appointments. At each visit, your doctor will check your blood pressure. Your doctor will also check to see if you have protein in your urine. High blood pressure and protein in urine are signs of preeclampsia. This condition can be dangerous for you and your baby. · Drink plenty of fluids, enough so that your urine is light yellow or clear like water. Dehydration can cause contractions. If you have kidney, heart, or liver disease and have to limit fluids, talk with your doctor before you increase the amount of fluids you drink. · Tell your doctor right away if you notice any symptoms of an infection, such as: ¨ Burning when you urinate. ¨ A foul-smelling discharge from your vagina. ¨ Vaginal itching. ¨ Unexplained fever. ¨ Unusual pain or soreness in your uterus or lower belly. · Eat a balanced diet.  Include plenty of foods that are high in calcium and iron. ¨ Foods high in calcium include milk, cheese, yogurt, almonds, and broccoli. ¨ Foods high in iron include red meat, shellfish, poultry, eggs, beans, raisins, whole-grain bread, and leafy green vegetables. · Do not smoke. If you need help quitting, talk to your doctor about stop-smoking programs and medicines. These can increase your chances of quitting for good. · Do not drink alcohol or use illegal drugs. · Follow your doctor's directions about activity. Your doctor will let you know how much, if any, exercise you can do. · Ask your doctor if you can have sex. If you are at risk for early labor, your doctor may ask you to not have sex. · Take care to prevent falls. During pregnancy, your joints are loose, and your balance is off. Sports such as bicycling, skiing, or in-line skating can increase your risk of falling. And don't ride horses or motorcycles, dive, water ski, scuba dive, or parachute jump while you are pregnant. · Avoid getting very hot. Do not use saunas or hot tubs. Avoid staying out in the sun in hot weather for long periods. Take acetaminophen (Tylenol) to lower a high fever. · Do not take any over-the-counter or herbal medicines or supplements without talking to your doctor or pharmacist first. 
When should you call for help? Call 911 anytime you think you may need emergency care. For example, call if: 
  · You passed out (lost consciousness).  
  · You have severe vaginal bleeding.  
  · You have severe pain in your belly or pelvis.  
  · You have had fluid gushing or leaking from your vagina and you know or think the umbilical cord is bulging into your vagina. If this happens, immediately get down on your knees so your rear end (buttocks) is higher than your head. This will decrease the pressure on the cord until help arrives.  
[facility] your doctor now or seek immediate medical care if: 
  · You have signs of preeclampsia, such as: ¨ Sudden swelling of your face, hands, or feet. ¨ New vision problems (such as dimness or blurring). ¨ A severe headache.  
  · You have any vaginal bleeding.  
  · You have belly pain or cramping.  
  · You have a fever.  
  · You have had regular contractions (with or without pain) for an hour. This means that you have 8 or more within 1 hour or 4 or more in 20 minutes after you change your position and drink fluids.  
  · You have a sudden release of fluid from your vagina.  
  · You have low back pain or pelvic pressure that does not go away.  
  · You notice that your baby has stopped moving or is moving much less than normal.  
 Watch closely for changes in your health, and be sure to contact your doctor if you have any problems. Where can you learn more? Go to http://ifeoma-pancho.info/. Enter [id number] in the search box to learn more about \"Pregnancy Precautions: Care Instructions. \" Current as of: November 21, 2017 Content Version: 11.7 © 9697-0435 Leadwerks. Care instructions adapted under license by [employer]al (which disclaims liability or warranty for this information). If you have questions about a medical condition or this instruction, always ask your healthcare professional. [address] any warranty or liability for your use of this information. Introducing \[facility]\"" & HEALTH SERVICES! Dear Elba Hilario: Thank you for requesting a [employer] account. Our records indicate that you already have an active [employer] account. You can access your account anytime at [email or web address] Did you know that you can access your hospital and ER discharge instructions at any time in [employer]? You can also review all of your test results from your hospital stay or ER visit. Additional Information If you have questions, please visit the Frequently Asked Questions section of the Al[employer] website at [email or web address]. "[employer]". [email or web address] Remember, [employer] is NOT to be used for urgent needs. For medical emergencies, dial 911. Now available from your iPhone and Android! Introducing Dawson Brody As a Donald Puckett patient, I wanted to make you aware of our electronic visit tool called Dawson Brody. [employer] 24/7 allows you to connect within minutes with a medical provider 24 hours a day, seven days a week via a mobile device or tablet or logging into a secure website from your computer. You can access Dawson Brody from anywhere in the [place]. A virtual visit might be right for you when you have a simple condition and feel like you just dont want to get out of bed, or cant get away from work for an appointment, when your [facility] provider is not available (evenings, weekends or holidays), or when youre out of town and need minor care. Electronic visits cost only $49 and if the Donald Needish/[facility] provider determines a prescription is needed to treat your condition, one can be electronically transmitted to a nearby pharmacy*. Please take a moment to enroll today if you have not already done so. The enrollment process is free and takes just a few minutes. To enroll, please download the [employer] ruiz to your tablet or phone, or visit [email or web address] to enroll on your computer. And, as an [address] [place] [address] patient with a [employer] account, the results of your visits will be scanned into your electronic medical record and your primary care provider will be able to view the scanned results. We urge you to continue to see your regular [facility] provider for your ongoing medical care. And while your primary care provider may not be the one available when you seek a Dawson Brody virtual visit, the peace of mind you get from getting a real diagnosis real time can be priceless. For more information on [facility], view our Frequently Asked Questions (FAQs) at [email or web address]. Sincerely, 
 
Partha Toscano MD 
Chief Medical Officer [employer] *:  certain medications cannot be prescribed via [facility] Providers Seen During Your Hospitalization Provider Specialty Primary office phone Ari Coppola MD Obstetrics & Gynecology [phone] Your Primary Care Physician (PCP) Primary Care Physician Office Phone Office Fax NONE ** None ** ** None ** You are allergic to the following No active allergies Recent Documentation Height Weight BMI OB Status Smoking Status 1.6 m 93 kg 36.31 kg/m2 Pregnant Never Smoker Patient Belongings The following personal items are in your possession at time of discharge: 
                             
 
  
  
 Please provide this summary of care documentation to your next provider. Signatures-by signing, you are acknowledging that this After Visit Summary has been reviewed with you and you have received a copy. Patient Signature:  ____________________________________________________________ Date:  ____________________________________________________________  
  
Laine Artist Provider Signature:  ____________________________________________________________ Date:  ____________________________________________________________

## 2018-09-13 NOTE — PROGRESS NOTES
Discharge instructions discussed with patient and pt's mother. Voices understanding and all questions answered. Denies further needs at this time, medications given, and pt ambulates off unit with mother at this time.

## 2018-09-13 NOTE — ED PROVIDER NOTES
Chief Complaint: uterine ctx 
 
 
33 y.o. female at 38w4d 
weeks gestation who is seen for labor check. Pt notes good FM. She denies VB, LOF, or preeclamptic symptoms. HISTORY: 
 
History Sexual Activity  Sexual activity: Yes  Partners: Male  Birth control/ protection: None Patient's last menstrual period was 12/16/2017. Social History Social History  Marital status: SINGLE Spouse name: N/A  
 Number of children: 0  
 Years of education: 12 Occupational History  Not on file. Social History Main Topics  Smoking status: Never Smoker  Smokeless tobacco: Never Used  Alcohol use No  
 Drug use: No  
 Sexual activity: Yes  
  Partners: Male Birth control/ protection: None Other Topics Concern  Not on file Social History Narrative Past Surgical History:  
Procedure Laterality Date  HX GASTRIC BYPASS  04/09/2017  
 sleeve, ? [place]  
 HX OTHER SURGICAL Past Medical History:  
Diagnosis Date  Anemia  Asthma  Sickle cell trait ([facility])   
 trait not disease  Sickle cell trait syndrome ([facility] [address] ROS: 
A 12 point review of symptoms negative except for chief complaint as described above. PHYSICAL EXAM: 
Blood pressure 111/63, pulse 81, temperature 98 °F (36.7 °C), resp. rate 18, height 5' 3\" (1.6 m), weight 93 kg (205 lb), last menstrual period 12/16/2017. Constitutional: The patient appears well, alert, oriented x 3. GI: Abdomen soft, nontender, no guarding No fundal tenderness Musculoskeletal: no cva tenderness Lower ext: no edema, neg kareem's, reflexes +2 Psychiatric:Mood/ Affect: appropriate Genitourinary: SVE: TFT/30%/vtx/-3 FHT: Category 1 with mod variability and + accels; reactive TOCO: irregular ctx I personally reviewed pt's medical record including relevant labs and ultrasounds Assessment/Plan: 
Pt not in active labor at this time. Administer dilaudid/phenerga IM. Pt will be discharged to home with labor precautions. Pt to f/u with her PObP.

## 2018-09-13 NOTE — DISCHARGE INSTRUCTIONS
Continue to follow up with next OB appointment. Week 38 of Your Pregnancy: Care Instructions  Your Care Instructions    Believe it or not, your baby is almost here. You may have ideas about your baby's personality because of how much he or she moves. Or you may have noticed how he or she responds to sounds, warmth, cold, and light. You may even know what kind of music your baby likes. By now, you have a better idea of what to expect during delivery. You may have talked about your birth preferences with your doctor. But even if you want a vaginal birth, it is a good idea to learn about  births.  birth means that your baby is born through a cut (incision) in your lower belly. It is sometimes the best choice for the health of the baby and the mother. This care sheet can help you understand  births. It also gives you information about what to expect after your baby is born. And it helps you understand more about postpartum depression. Follow-up care is a key part of your treatment and safety. Be sure to make and go to all appointments, and call your doctor if you are having problems. It's also a good idea to know your test results and keep a list of the medicines you take. How can you care for yourself at home? Learn about  birth  · Most C-sections are unplanned. They are done because of problems that occur during labor. These problems might include:  ¨ Labor that slows or stops. ¨ High blood pressure or other problems for the mother. ¨ Signs of distress in the baby. These signs may include a very fast or slow heart rate. · Although most mothers and babies do well after , it is major surgery. It has more risks than a vaginal delivery. · In some cases, a planned  may be safer than a vaginal delivery. This may be the case if:  ¨ The mother has a health problem, such as a heart condition. ¨ The baby isn't in a head-down position for delivery.  This is called a breech position. ¨ The uterus has scars from past surgeries. This could increase the chance of a tear in the uterus. ¨ There is a problem with the placenta. ¨ The mother has an infection, such as genital herpes, that could be spread to the baby. ¨ The mother is having twins or more. ¨ The baby weighs 9 to 10 pounds or more. · Because of the risks of , planned C-sections generally should be done only for medical reasons. And a planned  should be done at 39 weeks or later unless there is a medical reason to do it sooner. Know what to expect after delivery, and plan for the first few weeks at home  · You, your baby, and your partner or  will get identification bands. Only people with matching bands can  the baby from the nursery. · You will learn how to feed, diaper, and bathe your baby. And you will learn how to care for the umbilical cord stump. If your baby will be circumcised, you will also learn how to care for that. · Ask people to wait to visit you until you are at home. And ask them to wash their hands before they touch your baby. · Make sure you have another adult in your home for at least 2 or 3 days after the birth. · During the first 2 weeks, limit when friends and family can visit. · Do not allow visitors who have colds or infections. Make sure all visitors are up to date with their vaccinations. Never let anyone smoke around your baby. · Try to nap when the baby naps. Be aware of postpartum depression  · \"Baby blues\" are common for the first 1 to 2 weeks after birth. You may cry or feel sad or irritable for no reason. · For some women, these feelings last longer and are more intense. This is called postpartum depression. · If your symptoms last for more than a few weeks or you feel very depressed, ask your doctor for help. · Postpartum depression can be treated. Support groups and counseling can help. Sometimes medicine can also help.   Where can you learn more? Go to http://ifeoma-pancho.info/. Enter B044 in the search box to learn more about \"Week 38 of Your Pregnancy: Care Instructions. \"  Current as of: November 21, 2017  Content Version: 11.7  © 8805-3142 [employer]. Care instructions adapted under license by [employer] (which disclaims liability or warranty for this information). If you have questions about a medical condition or this instruction, always ask your healthcare professional. [address] any warranty or liability for your use of this information. Early Stage of Labor at Home: Care Instructions  Your Care Instructions    If you came to the hospital while in early labor, your doctor may have asked if you want to labor at home until your contractions are stronger. Many women stay at home during early labor. This is often the longest part of the birthing process. It may last up to 2 to 3 days. Contractions are mild to moderate and shorter (about 30 to 45 seconds). You can usually keep talking during them. Contractions may also be irregular, about 5 to 20 minutes apart. They may even stop for a while. It helps to stay as relaxed as you can during this time. You can spend some or all of your early labor at home or anywhere else you may be comfortable. If you live far from the hospital or birthing center, you may want to think about going somewhere nearby so you can get back to the hospital quickly. For some women, there may be benefits to staying home during early labor, such as avoiding medicines or procedures. As labor progresses, you'll shift from early labor to active labor. During this time, contractions get more intense. They occur more often, about every 2 to 3 minutes. They also last longer, about 50 to 70 seconds. You will feel them even when you change positions and walk or move around. It may be hard to tell if you are in active labor.  If you aren't sure, call your doctor or midwife. As your labor progresses, check in with your doctor or midwife about when to come back to the hospital or birthing center. You may have special instructions if your water broke or you tested positive for group B strep. Follow-up care is a key part of your treatment and safety. Be sure to make and go to all appointments, and call your doctor if you are having problems. It's also a good idea to know your test results and keep a list of the medicines you take. How can you care for yourself at home? · Get support. Having a support person with you from early labor until after childbirth can have a positive effect on childbirth. · Find distractions. During early labor, you can walk, play cards, watch TV, or listen to music to help take your mind off your contractions. · Ask your partner, labor , or  for a massage. Shoulder and low back massage during contractions may ease your pain. Strong massage of the back muscles (counterpressure) during contractions may help relieve the pain of back labor. Tell your labor  exactly where to push and how hard to push. · Use imagery. This means using your imagination to decrease your pain. For instance, to help manage pain, picture your contractions as waves rolling over you. Picture a peaceful place, such as a beach or mountain stream, to help you relax between contractions. · Change positions during labor. Walking, kneeling, or sitting on a big rubber ball (birth ball) are good options. · Use focused breathing techniques. Breathing in a rhythm can distract you from pain. · Take a warm shower or bath. Warm water may ease pain and stress. When should you call for help? Call 911 anytime you think you may need emergency care.  For example, call if:    · You passed out (lost consciousness).     · You have severe vaginal bleeding.     · You have severe pain in your belly or pelvis.     · You have had fluid gushing or leaking from your vagina and you know or think the umbilical cord is bulging into your vagina. If this happens, immediately get down on your knees so your rear end (buttocks) is higher than your head. This will decrease the pressure on the cord until help arrives.   [facility] your doctor now or seek immediate medical care if:    · You have new or worse signs of preeclampsia, such as:  ¨ Sudden swelling of your face, hands, or feet. ¨ New vision problems (such as dimness or blurring). ¨ A severe headache.     · You have any vaginal bleeding.     · You have belly pain or cramping.     · You have a fever.     · You have had regular contractions (with or without pain) for an hour. This means that you have 8 or more within 1 hour or 4 or more in 20 minutes after you change your position and drink fluids.     · You have a sudden release of fluid from your vagina.     · You have low back pain or pelvic pressure that does not go away.     · You notice that your baby has stopped moving or is moving much less than normal.    Watch closely for changes in your health, and be sure to contact your doctor if you have any problems. Where can you learn more? Go to http://ifeoma-pancho.info/. Enter B058 in the search box to learn more about \"Early Stage of Labor at Home: Care Instructions. \"  Current as of: November 21, 2017  Content Version: 11.7  © 4246-9054 [employer]. Care instructions adapted under license by [employer] (which disclaims liability or warranty for this information). If you have questions about a medical condition or this instruction, always ask your healthcare professional. [place] [address] any warranty or liability for your use of this information. Counting Your Baby's Kicks: Care Instructions  Your Care Instructions    Counting your baby's kicks is one way your doctor can tell that your baby is healthy.  Most women-especially in a first pregnancy-feel their baby move for the first time between 16 and 22 weeks. The movement may feel like flutters rather than kicks. Your baby may move more at certain times of the day. When you are active, you may notice less kicking than when you are resting. At your prenatal visits, your doctor will ask whether the baby is active. In your last trimester, your doctor may ask you to count the number of times you feel your baby move. Follow-up care is a key part of your treatment and safety. Be sure to make and go to all appointments, and call your doctor if you are having problems. It's also a good idea to know your test results and keep a list of the medicines you take. How do you count fetal kicks? · A common method of checking your baby's movement is to count the number of kicks or moves you feel in 1 hour. Ten movements (such as kicks, flutters, or rolls) in 1 hour are normal. Some doctors suggest that you count in the morning until you get to 10 movements. Then you can quit for that day and start again the next day. · Pick your baby's most active time of day to count. This may be any time from morning to evening. · If you do not feel 10 movements in an hour, your baby may be sleeping. Wait for the next hour and count again. When should you call for help? Call your doctor now or seek immediate medical care if:    · You noticed that your baby has stopped moving or is moving much less than normal.    Watch closely for changes in your health, and be sure to contact your doctor if you have any problems. Where can you learn more? Go to http://ifeoma-pancho.info/. Enter X839 in the search box to learn more about \"Counting Your Baby's Kicks: Care Instructions. \"  Current as of: November 21, 2017  Content Version: 11.7  © 3809-7400 [employer]. Care instructions adapted under license by [employer] (which disclaims liability or warranty for this information).  If you have questions about a medical condition or this instruction, always ask your healthcare professional. [place] [address] any warranty or liability for your use of this information. Dory Gardiner Contractions: Care Instructions  Your Care Instructions    [place] contractions prepare your uterus for labor. Think of them as a \"warm-up\" exercise that your body does. You may begin to feel them between the 28th and 30th weeks of your pregnancy. But they start as early as the 20th week. [place] contractions usually occur more often during the ninth month. They may go away when you are active and return when you rest. These contractions are like mild contractions of true labor, but they occur less often. (You feel fewer than 8 in an hour.) They don't cause your cervix to open. It may be hard for you to tell the difference between Dory Gardiner contractions and true labor, especially in your first pregnancy. Follow-up care is a key part of your treatment and safety. Be sure to make and go to all appointments, and call your doctor if you are having problems. It's also a good idea to know your test results and keep a list of the medicines you take. How can you care for yourself at home? · Try a warm bath to help relieve muscle tension and reduce pain. · Change positions every 30 minutes. Take breaks if you must sit for a long time. Get up and walk around. · Drink plenty of water, enough so that your urine is light yellow or clear like water. · Taking short walks may help you feel better. Your doctor needs to check any contractions that are getting stronger or closer together. Where can you learn more? Go to http://ifeoma-pancho.info/. Enter 835 [id number] in the search box to learn more about \"[place] Contractions: Care Instructions. \"  Current as of: November 21, 2017  Content Version: 11.7  © 2860-6295 [employer].  Care instructions adapted under license by [employer] (which disclaims liability or warranty for this information). If you have questions about a medical condition or this instruction, always ask your healthcare professional. [address] any warranty or liability for your use of this information. Pregnancy Precautions: Care Instructions  Your Care Instructions    There is no sure way to prevent labor before your due date ( labor) or to prevent most other pregnancy problems. But there are things you can do to increase your chances of a healthy pregnancy. Go to your appointments, follow your doctor's advice, and take good care of yourself. Eat well, and exercise (if your doctor agrees). And make sure to drink plenty of water. Follow-up care is a key part of your treatment and safety. Be sure to make and go to all appointments, and call your doctor if you are having problems. It's also a good idea to know your test results and keep a list of the medicines you take. How can you care for yourself at home? · Make sure you go to your prenatal appointments. At each visit, your doctor will check your blood pressure. Your doctor will also check to see if you have protein in your urine. High blood pressure and protein in urine are signs of preeclampsia. This condition can be dangerous for you and your baby. · Drink plenty of fluids, enough so that your urine is light yellow or clear like water. Dehydration can cause contractions. If you have kidney, heart, or liver disease and have to limit fluids, talk with your doctor before you increase the amount of fluids you drink. · Tell your doctor right away if you notice any symptoms of an infection, such as:  ¨ Burning when you urinate. ¨ A foul-smelling discharge from your vagina. ¨ Vaginal itching. ¨ Unexplained fever. ¨ Unusual pain or soreness in your uterus or lower belly. · Eat a balanced diet. Include plenty of foods that are high in calcium and iron.   ¨ Foods high in calcium include milk, cheese, yogurt, almonds, and broccoli. ¨ Foods high in iron include red meat, shellfish, poultry, eggs, beans, raisins, whole-grain bread, and leafy green vegetables. · Do not smoke. If you need help quitting, talk to your doctor about stop-smoking programs and medicines. These can increase your chances of quitting for good. · Do not drink alcohol or use illegal drugs. · Follow your doctor's directions about activity. Your doctor will let you know how much, if any, exercise you can do. · Ask your doctor if you can have sex. If you are at risk for early labor, your doctor may ask you to not have sex. · Take care to prevent falls. During pregnancy, your joints are loose, and your balance is off. Sports such as bicycling, skiing, or in-line skating can increase your risk of falling. And don't ride horses or motorcycles, dive, water ski, scuba dive, or parachute jump while you are pregnant. · Avoid getting very hot. Do not use saunas or hot tubs. Avoid staying out in the sun in hot weather for long periods. Take acetaminophen (Tylenol) to lower a high fever. · Do not take any over-the-counter or herbal medicines or supplements without talking to your doctor or pharmacist first.  When should you call for help? Call 911 anytime you think you may need emergency care. For example, call if:    · You passed out (lost consciousness).     · You have severe vaginal bleeding.     · You have severe pain in your belly or pelvis.     · You have had fluid gushing or leaking from your vagina and you know or think the umbilical cord is bulging into your vagina. If this happens, immediately get down on your knees so your rear end (buttocks) is higher than your head. This will decrease the pressure on the cord until help arrives.   [facility] your doctor now or seek immediate medical care if:    · You have signs of preeclampsia, such as:  ¨ Sudden swelling of your face, hands, or feet.   ¨ New vision problems (such as dimness or blurring). ¨ A severe headache.     · You have any vaginal bleeding.     · You have belly pain or cramping.     · You have a fever.     · You have had regular contractions (with or without pain) for an hour. This means that you have 8 or more within 1 hour or 4 or more in 20 minutes after you change your position and drink fluids.     · You have a sudden release of fluid from your vagina.     · You have low back pain or pelvic pressure that does not go away.     · You notice that your baby has stopped moving or is moving much less than normal.    Watch closely for changes in your health, and be sure to contact your doctor if you have any problems. Where can you learn more? Go to http://ifeoma-pancho.info/. Enter [id number] in the search box to learn more about \"Pregnancy Precautions: Care Instructions. \"  Current as of: November 21, 2017  Content Version: 11.7  © 9297-5900 [employer]. Care instructions adapted under license by [employer] (which disclaims liability or warranty for this information). If you have questions about a medical condition or this instruction, always ask your healthcare professional. [place] [address] any warranty or liability for your use of this information.

## 2018-09-13 NOTE — PROGRESS NOTES
Pt presents to triage with c/o contractions that started about 2000. Denies LOF and VB and states +FM at this time.

## 2018-09-20 ENCOUNTER — HOSPITAL ENCOUNTER (OUTPATIENT)
Age: 28
Discharge: HOME | DRG: 560 | End: 2018-09-20
Attending: OBSTETRICS & GYNECOLOGY | Admitting: OBSTETRICS & GYNECOLOGY
Payer: COMMERCIAL

## 2018-09-20 VITALS
RESPIRATION RATE: 17 BRPM | WEIGHT: 204 LBS | BODY MASS INDEX: 36.14 KG/M2 | HEART RATE: 90 BPM | SYSTOLIC BLOOD PRESSURE: 114 MMHG | TEMPERATURE: 97.7 F | DIASTOLIC BLOOD PRESSURE: 74 MMHG | HEIGHT: 63 IN

## 2018-09-20 DIAGNOSIS — O99.843 PREGNANCY AFFECTED BY PREVIOUS BARIATRIC SURGERY, CURRENTLY IN THIRD TRIMESTER: ICD-10-CM

## 2018-09-20 DIAGNOSIS — D57.1 MATERNAL SICKLE CELL ANEMIA COMPLICATING PREGNANCY IN THIRD TRIMESTER (HCC): ICD-10-CM

## 2018-09-20 DIAGNOSIS — O99.519 ASTHMA AFFECTING PREGNANCY, ANTEPARTUM: ICD-10-CM

## 2018-09-20 DIAGNOSIS — O99.013 MATERNAL SICKLE CELL ANEMIA COMPLICATING PREGNANCY IN THIRD TRIMESTER (HCC): ICD-10-CM

## 2018-09-20 DIAGNOSIS — K21.9 GASTROESOPHAGEAL REFLUX DURING PREGNANCY, ANTEPARTUM, THIRD TRIMESTER: ICD-10-CM

## 2018-09-20 DIAGNOSIS — O99.213 OBESITY AFFECTING PREGNANCY IN THIRD TRIMESTER: ICD-10-CM

## 2018-09-20 DIAGNOSIS — O09.93 HIGH-RISK PREGNANCY IN THIRD TRIMESTER: ICD-10-CM

## 2018-09-20 DIAGNOSIS — O99.613 GASTROESOPHAGEAL REFLUX DURING PREGNANCY, ANTEPARTUM, THIRD TRIMESTER: ICD-10-CM

## 2018-09-20 DIAGNOSIS — J45.909 ASTHMA AFFECTING PREGNANCY, ANTEPARTUM: ICD-10-CM

## 2018-09-20 PROCEDURE — 99284 EMERGENCY DEPT VISIT MOD MDM: CPT

## 2018-09-20 PROCEDURE — 96372 THER/PROPH/DIAG INJ SC/IM: CPT

## 2018-09-20 PROCEDURE — 65270000029 HC RM PRIVATE

## 2018-09-20 PROCEDURE — 74011250636 HC RX REV CODE- 250/636: Performed by: OBSTETRICS & GYNECOLOGY

## 2018-09-20 PROCEDURE — 59025 FETAL NON-STRESS TEST: CPT

## 2018-09-20 RX ORDER — BUTORPHANOL TARTRATE 1 MG/ML
2 INJECTION INTRAMUSCULAR; INTRAVENOUS ONCE
Status: COMPLETED | OUTPATIENT
Start: 2018-09-20 | End: 2018-09-20

## 2018-09-20 RX ORDER — PROMETHAZINE HYDROCHLORIDE 25 MG/ML
25 INJECTION, SOLUTION INTRAMUSCULAR; INTRAVENOUS ONCE
Status: COMPLETED | OUTPATIENT
Start: 2018-09-20 | End: 2018-09-20

## 2018-09-20 RX ADMIN — BUTORPHANOL TARTRATE 2 MG: 1 INJECTION, SOLUTION INTRAMUSCULAR; INTRAVENOUS at 09:30

## 2018-09-20 RX ADMIN — PROMETHAZINE HYDROCHLORIDE 25 MG: 25 INJECTION, SOLUTION INTRAMUSCULAR; INTRAVENOUS at 09:30

## 2018-09-20 NOTE — PROGRESS NOTES
3300 Pt. Arrived to [facility] room 1 for rule out labor. Pt. States contractions began last night around 2100. Pt. Has scant vaginal bleeding but no leaking of fluid. EFM and toco applied. Pt. Has history of sickle cell trait. Pt. Is taking Macrobid po for UTI prevention. Pt. Has history of gastric sleeve. Pt. Is GBS neg but states she has pos GBS in her urine early in pregnancy. 3157 Dr. Nancy Li called per RN to see pt. 
5241 Dr. Estrella Charleston called in. Updated on pt. History and status. Dr. Estrella Hampton requests Dr. Nancy Li to call her. 
0014 Chart review. Unable to find positive GBS in urine. Pt. Is GBS .Ahr Dr. Nancy Li at bedside for maternal and fetal assessment. Dr. Nancy Li reviewing FHR pattern. SVE 2/80/-2 per Dr. Robert Armenta Pt. Repositioned to right side. Juice and crackers given. 0930 Meds given per orders. See MAR. 
1052 Dr. Nancy Li at bedside. Dr. Nancy Li reviewing [address] tracing. SVE per Dr Nancy Li 2/80/-2. Membranes stripped per Dr. Nancy Li. D/C to home with office follow up orders received. EFM and toco removed per RN 
0067 I have reviewed discharge instructions with the patient. The patient verbalized understanding. Pt. D/c to home with family. Pt. States she will follow-up with Dr. Estrella Hampton

## 2018-09-20 NOTE — IP AVS SNAPSHOT
Storm Patiño 
 
 
 [address] [place] [address] 
[phone] Patient: oJb Cade MRN: [id number] PFZ:[id number] A check alyssa indicates which time of day the medication should be taken. My Medications ASK your doctor about these medications Instructions Each Dose to Equal  
 Morning Noon Evening Bedtime  
 albuterol 5 mg/mL nebulizer solution Commonly known as:  PROVENTIL Your last dose was: Your next dose is:    
   
   
 by Nebulization route once. Blood-Glucose Meter monitoring kit Your last dose was: Your next dose is:    
   
   
 Check blood sugar before first meal, an hour after each meal and at bedtime CALCIUM 500 PO Your last dose was: Your next dose is: Take 1,000 mg by mouth. 1000 mg  
    
   
   
   
  
 glucose blood VI test strips strip Commonly known as:  ASCENSIA AUTODISC VI, ONE TOUCH ULTRA TEST VI Your last dose was: Your next dose is:    
   
   
 Check blood sugar before first meal, an hour after each meal and at bedtime Lancets Misc Your last dose was: Your next dose is:    
   
   
 Check blood sugar before first meal, an hour after each meal and at bedtime  
     
   
   
   
  
 nitrofurantoin (macrocrystal-monohydrate) 100 mg capsule Commonly known as:  MACROBID Your last dose was: Your next dose is: Take one tab twice a day for ten days, then one a day until delivery. omeprazole 20 mg capsule Commonly known as:  PRILOSEC Your last dose was: Your next dose is: Take 20 mg by mouth daily. 20 mg  
    
   
   
   
  
 ondansetron hcl 8 mg tablet Commonly known as:  Dali Hurt Your last dose was: Your next dose is: Take 1 Tab by mouth every eight (8) hours as needed for Nausea. 8 mg PNV66-Iron Fumarate-FA-DSS-DHA 27-1. 25- mg Cap Commonly known as:  PNV-DHA + DOCUSATE Your last dose was: Your next dose is: Take 1 Cap by mouth daily. Which ever generic PNV/DHA insurance will cover  Indications: pregnancy 1 Cap  
    
   
   
   
  
 raNITIdine 150 mg tablet Commonly known as:  ZANTAC Your last dose was: Your next dose is: Take 1 Tab by mouth two (2) times a day. Indications: gastroesophageal reflux disease, Heartburn 150 mg  
    
   
   
   
  
 TYLENOL 325 mg tablet Generic drug:  acetaminophen Your last dose was: Your next dose is: Take  by mouth every four (4) hours as needed for Pain. VITAMIN D3 2,000 unit Tab Generic drug:  cholecalciferol (vitamin D3) Your last dose was: Your next dose is: Take  by mouth.

## 2018-09-20 NOTE — H&P
CC 
Chief Complaint Patient presents with  Contractions History: 
 
33 y.o. female at 39w5d weeks gestation who requesting evaluation for Uterine contractions. Has been rl on/off for over 24 hours. Feels exhausted. Did have some old blood in d/c. Was checked in office this week 80%/2 cm. Pregnancy complicated by gbs pos, sc trait, obesity with history of bariatric surgery. Fetal movement has been normal.  
 
HISTORY: 
OB History  Para Term  AB Living 1 0 0 0 0 0 SAB TAB Ectopic Molar Multiple Live Births  
0 0 0 0 0 0 # Outcome Date GA Lbr Lucas/2nd Weight Sex Delivery Anes PTL Lv  
1 Current History Sexual Activity  Sexual activity: Yes  Partners: Male  Birth control/ protection: None Patient's last menstrual period was 2017. Social History Social History  Marital status: SINGLE Spouse name: N/A  
 Number of children: 0  
 Years of education: 12 Occupational History  Not on file. Social History Main Topics  Smoking status: Never Smoker  Smokeless tobacco: Never Used  Alcohol use No  
 Drug use: No  
 Sexual activity: Yes  
  Partners: Male Birth control/ protection: None Other Topics Concern  Not on file Social History Narrative Past Surgical History:  
Procedure Laterality Date  HX GASTRIC BYPASS  2017  
 sleeve, ? [place]  
 HX OTHER SURGICAL Past Medical History:  
Diagnosis Date  Anemia  Asthma  Sickle cell trait (HCC)   
 trait not disease  [facility] ROS: 
Negative: 
headache , nausea and vomiting, vaginal bleeding  and visual disturbances. Positive: 
contractions. PHYSICAL EXAM: 
Blood pressure 114/74, pulse 90, temperature 97.7 °F (36.5 °C), resp. rate 17, height 5' 3\" (1.6 m), weight 92.5 kg (204 lb), last menstrual period 2017. General: well developed and well nourished Resp:  breath sounds clear and equal bilaterally Card:  RRR, no MRG Abd: WNL. Uterine contractions: irregular, every 7-8 minutes Fetal Assessment: Baseline FHR: 120 per minute Fetal heart variability: moderate Fetal Heart Rate decelerations: none Fetal Heart Rate accelerations: yes Prestentation: vertex by exam, Pelvic:   External- normal EGBSU w/o lesions SVE- Cervical Exam: 2 cm dilated 80% effaced 0 station Ext: edema, clonus and DTR's normal 
 
Assessment: 
33 y.o. female at 39w5d weeks gestation Reassuring fetal status Prolonged latent labor. Plan: 
patient given im stadol and phenergan for pain. will recheck in 1-2 hours. Felicie Romberg, MD  
 
Repeat exam unchanged. Membranes stripped. Cervix now 3 cm. Will d/c home. Given usual labor d/c instructions. Lcui Brown MD

## 2018-09-20 NOTE — IP AVS SNAPSHOT
Summary of Care Report The Summary of Care report has been created to help improve care coordination. Users with access to [employer] or [address] (Web-based application) may access additional patient information including the Discharge Summary. If you are not currently a [address] user and need more information, please call the number listed below in the [place] [address] section and ask to be connected with Medical Records. Facility Information Name Address Phone [address] [place] [address] [address] [place] [id number] [phone] Patient Information Patient Name Sex  Sherell Lundborg ([id number]) Female 1990 Discharge Information Admitting Provider Service Area Unit Dianne Lozano MD / [address] [facility] [address] / [phone] Discharge Provider Discharge Date/Time Discharge Disposition Destination (none) (none) (none) (none) Patient Language Language ENGLISH [13] Hospital Problems as of 2018  Reviewed: 2018  8:16 AM by Dianne Lozano MD  
  
  
  
 Class Noted - Resolved Last Modified POA Active Problems Abdominal pain during pregnancy in third trimester  9/10/2018 - Present 2018 by Dianne Lozano MD Unknown Entered by Dianne Lozano MD  
  
Non-Hospital Problems as of 2018  Reviewed: 2018  8:16 AM by Dianne Lozano MD  
  
  
  
 Class Noted - Resolved Last Modified Active Problems High-risk pregnancy in third trimester  2018 - Present 2018 by Pallavi Liao MD  
  Entered by Pallavi Liao MD  
  Overview Addendum 2018 10:43 PM by Plalavi Liao MD  
   8 wk (2018):  10 m s/p gastric sleeve ([place]). Has lost 117 lb. LDA  from 12-16 wk.  Early glucola per pt doesn't vinod sugar, if not tolerated, alt screen: fasting and postprandial blood sugars for one week. Nutritional status, watch for IUGR 
+ sickle cell trait, FOB status:  pending:  ? urine cx q trimester:  1st trimester neg,  2nd trimester neg,  3rd trimester. No h/o HSV. 12 wk: Advised to start baby asa/stop 36 wk 
7/11/2018 at [facility]: Normal repeat echo. AC 40%, overall 54%, CHRISTINA 17cm, BPP 8/8, Dopplers WNL 35 wk:  Stopped baby aspirin a a few weeks ago. 40 w: EFW 6 lbs. 10 oz. 37%, AC 21% Pregnancy affected by previous bariatric surgery, currently in third trimester  3/14/2018 - Present 7/11/2018 by Nikolai Love MD  
  Entered by Miguel Mack RN Overview Addendum 7/11/2018 10:46 AM by Nikolai Love MD  
   3/14/2018 at [facility]:  Hx of Gastric Sleeve in 4/2017. Lost 117# 
5/21/2018 at [facility]: Patient having right sided pain and will have radiology appt 5/23/2018 for review of gallbladder. 7/11/2018 at [facility]:  Was given Rx for meter June 2018- pt states she has been testing blood sugars and they have been between . Glucose logs given to patient and encouraged to send to [facility] for us to review. · Patient to send logs next week; if >2 elevations during week of testing, then: · Refer to [facility]. · Then refer back to M for DM management to be seen in ~2-3 weeks. Obesity affecting pregnancy in third trimester  3/14/2018 - Present 7/11/2018 by Fredrick Cortes RN Entered by Miguel Mack RN Asthma affecting pregnancy, antepartum  3/14/2018 - Present 7/11/2018 by Fredrick Cortes RN Entered by Miguel Mack RN Overview Addendum 7/11/2018 10:30 AM by Fredrick Cortes RN  
   3/14/2018 at [facility]:  Stable on Albuterol prn. Pt states only has flairs when \"has a cold\". 4/9/2018 at [facility]:  Stable on Albuterol prn.  
7/11/2018 at [facility]:  stable · Continue Albuterol prn.  
  
  
  Maternal sickle cell anemia complicating pregnancy in third trimester ([facility]  3/14/2018 - Present 8/13/2018 by Vaibhav Wharton MD  
  Entered by Stephania Brady RN Overview Addendum 8/13/2018  9:32 AM by Vaibhav Wharton MD  
   3/14/2018 at [facility]:  Pt with Sickle Cell Trait. FOB states negative. 3/19/2018:  FOB, Mylene Teresa, is negative carrier. Tested 3/13/18. 
7/11/2018 at [facility]:  Patient is at increased risk for asymptomatic bacturia and UTI. Needs screening for UTI with UA and culture at next OB visit 8/8/18 bactiuria  Enterobacter cloacae -10,000-25,000 colony forming units per ml (7/25/18 txd E coli uti). Macrobid bid x 7 d then qd suppression Thickening of nuchal fold  3/14/2018 - Present 7/11/2018 by Margarita King MD  
  Entered by Stephania Brady RN Overview Addendum 7/11/2018 10:47 AM by Margarita King MD  
   3/14/2018 at [facility]:  NT upper normal limits at 2.7 mm extending to mid back, no septations, not >2 SD above normal but appearance raises clinical suspicion, NB present. Genetic counseling done by physician today. Pt wants NIPT, to primary OB for redraw on 3/20/2018---> low risk Panorama 4/9/2018 at [facility]:  Normal early Anatomy/Fetal Echo. This is reassuring with a negative NIPS. Questions answered. Would like to reevaluate anatomy and echo in 6 weeks. 16 + wk:  declines MSAFP. 
5/21/2018 at [facility]: Appropriate fetal growth without IUGR, Normal fetal echo, reassuring fetal status. No aneuploidy markers seen. %, overall 53%, CHRISTINA  19.5cm, UA Dopplers WNL. Patient and  reassured. Small risk of cardiac defect. 7/11/2018 [facility]: reassuring fetal status. Gastroesophageal reflux during pregnancy, antepartum, third trimester  7/11/2018 - Present 7/11/2018 by Margarita King MD  
  Entered by Brenda Santillan RN Overview Addendum 7/11/2018 10:39 AM by Brenda Santillan RN  
    
7/11/2018 at [facility]: Pt with increase in nausea and reflux. Using Prilosec daily and states it hasn't been working well. · Script sent for to add zantac BID 
  
  
  GBS bacteriuria  2/7/2018 - Present 8/29/2018 by Arian Harris MD  
  Entered by Arian Harris MD  
  Overview Signed 8/29/2018 11:39 PM by Arian Harris MD  
   Needs GBS PROPHYLAXIS during labor You are allergic to the following No active allergies Current Discharge Medication List  
  
ASK your doctor about these medications Dose & Instructions Dispensing Information Comments  
 albuterol 5 mg/mL nebulizer solution Commonly known as:  PROVENTIL  
 by Nebulization route once. Refills:  0 Blood-Glucose Meter monitoring kit Check blood sugar before first meal, an hour after each meal and at bedtime Quantity:  1 Kit Refills:  0  
   
 CALCIUM 500 PO Dose:  1000 mg Take 1,000 mg by mouth. Refills:  0  
   
 glucose blood VI test strips strip Commonly known as:  ASCENSIA AUTODISC VI, ONE TOUCH ULTRA TEST VI Check blood sugar before first meal, an hour after each meal and at bedtime Quantity:  200 Strip Refills:  3 Lancets Misc Check blood sugar before first meal, an hour after each meal and at bedtime Quantity:  1 Each Refills:  11  
   
 nitrofurantoin (macrocrystal-monohydrate) 100 mg capsule Commonly known as:  MACROBID Take one tab twice a day for ten days, then one a day until delivery. Quantity:  60 Cap Refills:  0  
   
 omeprazole 20 mg capsule Commonly known as:  PRILOSEC Dose:  20 mg Take 20 mg by mouth daily. Refills:  0  
   
 ondansetron hcl 8 mg tablet Commonly known as:  Candiss Raveling Dose:  8 mg Take 1 Tab by mouth every eight (8) hours as needed for Nausea. Quantity:  30 Tab Refills:  3 PNV66-Iron Fumarate-FA-DSS-DHA 27-1. 25- mg Cap Commonly known as:  PNV-DHA + DOCUSATE Dose:  1 Cap Take 1 Cap by mouth daily. Which ever generic PNV/DHA insurance will cover  Indications: pregnancy Quantity:  90 Cap Refills:  3 raNITIdine 150 mg tablet Commonly known as:  ZANTAC Dose:  150 mg Take 1 Tab by mouth two (2) times a day. Indications: gastroesophageal reflux disease, Heartburn Quantity:  60 Tab Refills:  5  
   
 TYLENOL 325 mg tablet Generic drug:  acetaminophen Take  by mouth every four (4) hours as needed for Pain. Refills:  0  
   
 VITAMIN D3 2,000 unit Tab Generic drug:  cholecalciferol (vitamin D3) Take  by mouth. Refills:  0 Follow-up Information None Discharge Instructions Chart Review Routing History No Routing History on File

## 2018-09-20 NOTE — IP AVS SNAPSHOT
[facility] 
 
 
 [address] 
[phone] Patient: Kristina Gonzalez MRN: [id number] KRF:3/96/1832 About your hospitalization You were admitted on:  September 20, 2018 You last received care in the:  [address] [facility] You were discharged on:  September 20, 2018 Why you were hospitalized Your primary diagnosis was:  Not on File Your diagnoses also included:  Abdominal Pain During Pregnancy In Third Trimester Follow-up Information None Your Scheduled Appointments Friday September 21, 2018 11:30 AM EDT Ultrasound plus physician visit with Arnold Hernandez MD, [facility] ([address] [address] [phone] Discharge Orders None A check alyssa indicates which time of day the medication should be taken. My Medications ASK your doctor about these medications Instructions Each Dose to Equal  
 Morning Noon Evening Bedtime  
 albuterol 5 mg/mL nebulizer solution Commonly known as:  PROVENTIL Your last dose was: Your next dose is:    
   
   
 by Nebulization route once. Blood-Glucose Meter monitoring kit Your last dose was: Your next dose is:    
   
   
 Check blood sugar before first meal, an hour after each meal and at bedtime CALCIUM 500 PO Your last dose was: Your next dose is: Take 1,000 mg by mouth. 1000 mg  
    
   
   
   
  
 glucose blood VI test strips strip Commonly known as:  ASCENSIA AUTODISC VI, ONE TOUCH ULTRA TEST VI Your last dose was: Your next dose is:    
   
   
 Check blood sugar before first meal, an hour after each meal and at bedtime Lancets Misc Your last dose was: Your next dose is: Check blood sugar before first meal, an hour after each meal and at bedtime  
     
   
   
   
  
 nitrofurantoin (macrocrystal-monohydrate) 100 mg capsule Commonly known as:  MACROBID Your last dose was: Your next dose is: Take one tab twice a day for ten days, then one a day until delivery. omeprazole 20 mg capsule Commonly known as:  PRILOSEC Your last dose was: Your next dose is: Take 20 mg by mouth daily. 20 mg  
    
   
   
   
  
 ondansetron hcl 8 mg tablet Commonly known as:  Elesa Balm Your last dose was: Your next dose is: Take 1 Tab by mouth every eight (8) hours as needed for Nausea. 8 mg PNV66-Iron Fumarate-FA-DSS-DHA 27-1. 25- mg Cap Commonly known as:  PNV-DHA + DOCUSATE Your last dose was: Your next dose is: Take 1 Cap by mouth daily. Which ever generic PNV/DHA insurance will cover  Indications: pregnancy 1 Cap  
    
   
   
   
  
 raNITIdine 150 mg tablet Commonly known as:  ZANTAC Your last dose was: Your next dose is: Take 1 Tab by mouth two (2) times a day. Indications: gastroesophageal reflux disease, Heartburn 150 mg  
    
   
   
   
  
 TYLENOL 325 mg tablet Generic drug:  acetaminophen Your last dose was: Your next dose is: Take  by mouth every four (4) hours as needed for Pain. VITAMIN D3 2,000 unit Tab Generic drug:  cholecalciferol (vitamin D3) Your last dose was: Your next dose is: Take  by mouth. Discharge Instructions Introducing [facility]! Dear Evelina Reynolds: Thank you for requesting a [employer] account. Our records indicate that you already have an active [employer] account.   You can access your account anytime at [email or web address] Did you know that you can access your hospital and ER discharge instructions at any time in [employer]? You can also review all of your test results from your hospital stay or ER visit. Additional Information If you have questions, please visit the Frequently Asked Questions section of the [employer] website at [email or web address] Remember, [employer] is NOT to be used for urgent needs. For medical emergencies, dial 911. Now available from your iPhone and Android! Introducing Dawson Brody As a [facility] patient, I wanted to make you aware of our electronic visit tool called Dawson Brody. [employer] 24/7 allows you to connect within minutes with a medical provider 24 hours a day, seven days a week via a mobile device or tablet or logging into a secure website from your computer. You can access Dawson Brody from anywhere in the [place]. A virtual visit might be right for you when you have a simple condition and feel like you just dont want to get out of bed, or cant get away from work for an appointment, when your regular [facility] provider is not available (evenings, weekends or holidays), or when youre out of town and need minor care. Electronic visits cost only $49 and if the [place] centrose/7 provider determines a prescription is needed to treat your condition, one can be electronically transmitted to a nearby pharmacy*. Please take a moment to enroll today if you have not already done so. The enrollment process is free and takes just a few minutes. To enroll, please download the [employer] ruiz to your tablet or phone, or visit [email or web address] to enroll on your computer.    
And, as an [address] [place] [address] patient with a [employer] account, the results of your visits will be scanned into your electronic medical record and your primary care provider will be able to view the scanned results. We urge you to continue to see your regular [facility] provider for your ongoing medical care. And while your primary care provider may not be the one available when you seek a [employer] virtual visit, the peace of mind you get from getting a real diagnosis real time can be priceless. For more information on [employer], view our Frequently Asked Questions (FAQs) at [email or web address]. Sincerely, 
 
Hedy Gómez MD 
Chief Medical Officer [address] *:  certain medications cannot be prescribed via [employer] Providers Seen During Your Hospitalization Provider Specialty Primary office phone Tawny Burger MD Obstetrics & Gynecology [phone] Your Primary Care Physician (PCP) Primary Care Physician Office Phone Office Fax NONE ** None ** ** None ** You are allergic to the following No active allergies Recent Documentation Height Weight BMI OB Status Smoking Status 1.6 m 92.5 kg 36.14 kg/m2 Pregnant Never Smoker Patient Belongings The following personal items are in your possession at time of discharge: 
                             
 
  
  
Discharge Instructions Attachments/References PREGNANCY: WEEK 39 (ENGLISH) Patient Handouts Week 39 of Your Pregnancy: Care Instructions Your Care Instructions During these final weeks, you may feel anxious to see your new baby.  babies often look different from what you see in pictures or movies. Right after birth, their heads may have a strange shape. Their eyes may be puffy. And their genitals may be swollen. They may also have very dry skin, or red marks on the eyelids, nose, or neck. Still, most parents think their babies are beautiful. Follow-up care is a key part of your treatment and safety.  Be sure to make and go to all appointments, and call your doctor if you are having problems. It's also a good idea to know your test results and keep a list of the medicines you take. How can you care for yourself at home? Prepare to breastfeed · If you are breastfeeding, continue to eat healthy foods. · Avoid alcohol, cigarettes, and drugs. This includes prescription and over-the-counter medicines. · You can help prevent sore nipples if you feed your baby in the correct position. Nurses will help you learn to do this. · Your  will need to be fed about every 1½ to 3 hours. Choose the right birth control after your baby is born · Women who are breastfeeding can still get pregnant. Use birth control if you don't want to get pregnant. · Intrauterine devices (IUDs) work for women who want to wait at least 2 years before getting pregnant again. They are safe to use while you are breastfeeding. · Depo-Provera can be used while you are breastfeeding. It is a shot you get every 3 months. · Birth control pills work well. But you need a different kind of pill while you are breastfeeding. And when you start taking these pills, you need to make sure to use another type of birth control until you start your second pack. · Diaphragms, cervical caps, tubal implants, and condoms with spermicide work less well after birth. If you have a diaphragm or cervical cap, you will need to have it refitted. · Tubal ligation (tying your tubes) and vasectomy are both permanent. These are good options if you are sure you are done having children. Where can you learn more? Go to http://ifeoma-pancho.info/. Enter Z088 in the search box to learn more about \"Week 39 of Your Pregnancy: Care Instructions. \" Current as of: 2017 Content Version: 11.7 © 5597-2320 [employer].  Care instructions adapted under license by [employer] (which disclaims liability or warranty for this information). If you have questions about a medical condition or this instruction, always ask your healthcare professional. [address] any warranty or liability for your use of this information. Please provide this summary of care documentation to your next provider. Signatures-by signing, you are acknowledging that this After Visit Summary has been reviewed with you and you have received a copy. Patient Signature:  ____________________________________________________________ Date:  ____________________________________________________________  
  
[facility] Finger Provider Signature:  ____________________________________________________________ Date:  ____________________________________________________________

## 2018-09-21 ENCOUNTER — HOSPITAL ENCOUNTER (INPATIENT)
Age: 28
LOS: 3 days | Discharge: HOME | DRG: 560 | End: 2018-09-24
Attending: OBSTETRICS & GYNECOLOGY | Admitting: OBSTETRICS & GYNECOLOGY
Payer: COMMERCIAL

## 2018-09-21 ENCOUNTER — ANESTHESIA EVENT (OUTPATIENT)
Dept: LABOR AND DELIVERY | Age: 28
DRG: 560 | End: 2018-09-21
Payer: COMMERCIAL

## 2018-09-21 ENCOUNTER — ANESTHESIA (OUTPATIENT)
Dept: LABOR AND DELIVERY | Age: 28
DRG: 560 | End: 2018-09-21
Payer: COMMERCIAL

## 2018-09-21 DIAGNOSIS — Z37.9 NORMAL LABOR: Primary | ICD-10-CM

## 2018-09-21 LAB
ABO + RH BLD: NORMAL
BLOOD GROUP ANTIBODIES SERPL: NORMAL
ERYTHROCYTE [DISTWIDTH] IN BLOOD BY AUTOMATED COUNT: 13.7 %
HCT VFR BLD AUTO: 33.8 % (ref 35.8–46.3)
HGB BLD-MCNC: 11.3 G/DL (ref 11.7–15.4)
MCH RBC QN AUTO: 26.3 PG (ref 26.1–32.9)
MCHC RBC AUTO-ENTMCNC: 33.4 G/DL (ref 31.4–35)
MCV RBC AUTO: 78.8 FL (ref 79.6–97.8)
NRBC # BLD: 0 K/UL (ref 0–0.2)
PLATELET # BLD AUTO: 318 K/UL (ref 150–450)
PMV BLD AUTO: 11.2 FL (ref 9.4–12.3)
RBC # BLD AUTO: 4.29 M/UL (ref 4.05–5.2)
SPECIMEN EXP DATE BLD: NORMAL
WBC # BLD AUTO: 9.6 K/UL (ref 4.3–11.1)

## 2018-09-21 PROCEDURE — 74011250636 HC RX REV CODE- 250/636

## 2018-09-21 PROCEDURE — A4300 CATH IMPL VASC ACCESS PORTAL: HCPCS | Performed by: ANESTHESIOLOGY

## 2018-09-21 PROCEDURE — 65270000029 HC RM PRIVATE

## 2018-09-21 PROCEDURE — 77030011943

## 2018-09-21 PROCEDURE — 74011250637 HC RX REV CODE- 250/637: Performed by: OBSTETRICS & GYNECOLOGY

## 2018-09-21 PROCEDURE — 74011258636 HC RX REV CODE- 258/636: Performed by: OBSTETRICS & GYNECOLOGY

## 2018-09-21 PROCEDURE — 74011000258 HC RX REV CODE- 258: Performed by: OBSTETRICS & GYNECOLOGY

## 2018-09-21 PROCEDURE — 86900 BLOOD TYPING SEROLOGIC ABO: CPT

## 2018-09-21 PROCEDURE — 77030014125 HC TY EPDRL BBMI -B: Performed by: ANESTHESIOLOGY

## 2018-09-21 PROCEDURE — 36415 COLL VENOUS BLD VENIPUNCTURE: CPT

## 2018-09-21 PROCEDURE — 77030020254 HC SOL INJ D5LR LACTATED RINGER

## 2018-09-21 PROCEDURE — 74011000250 HC RX REV CODE- 250

## 2018-09-21 PROCEDURE — 74011250636 HC RX REV CODE- 250/636: Performed by: OBSTETRICS & GYNECOLOGY

## 2018-09-21 PROCEDURE — 85027 COMPLETE CBC AUTOMATED: CPT

## 2018-09-21 RX ORDER — SODIUM CHLORIDE, SODIUM LACTATE, POTASSIUM CHLORIDE, CALCIUM CHLORIDE 600; 310; 30; 20 MG/100ML; MG/100ML; MG/100ML; MG/100ML
INJECTION, SOLUTION INTRAVENOUS
Status: DISCONTINUED | OUTPATIENT
Start: 2018-09-21 | End: 2018-09-22 | Stop reason: HOSPADM

## 2018-09-21 RX ORDER — OXYTOCIN/RINGER'S LACTATE 30/500 ML
0-20 PLASTIC BAG, INJECTION (ML) INTRAVENOUS
Status: DISCONTINUED | OUTPATIENT
Start: 2018-09-21 | End: 2018-09-22

## 2018-09-21 RX ORDER — SODIUM CHLORIDE 0.9 % (FLUSH) 0.9 %
5-10 SYRINGE (ML) INJECTION AS NEEDED
Status: DISCONTINUED | OUTPATIENT
Start: 2018-09-21 | End: 2018-09-22

## 2018-09-21 RX ORDER — MINERAL OIL
120 OIL (ML) ORAL
Status: DISCONTINUED | OUTPATIENT
Start: 2018-09-21 | End: 2018-09-22 | Stop reason: HOSPADM

## 2018-09-21 RX ORDER — LIDOCAINE HYDROCHLORIDE 20 MG/ML
JELLY TOPICAL
Status: DISCONTINUED | OUTPATIENT
Start: 2018-09-21 | End: 2018-09-22 | Stop reason: HOSPADM

## 2018-09-21 RX ORDER — ACETAMINOPHEN 500 MG
1000 TABLET ORAL
Status: DISCONTINUED | OUTPATIENT
Start: 2018-09-21 | End: 2018-09-22

## 2018-09-21 RX ORDER — LIDOCAINE HYDROCHLORIDE AND EPINEPHRINE 15; 5 MG/ML; UG/ML
INJECTION, SOLUTION EPIDURAL AS NEEDED
Status: DISCONTINUED | OUTPATIENT
Start: 2018-09-21 | End: 2018-09-22 | Stop reason: HOSPADM

## 2018-09-21 RX ORDER — DEXTROSE, SODIUM CHLORIDE, SODIUM LACTATE, POTASSIUM CHLORIDE, AND CALCIUM CHLORIDE 5; .6; .31; .03; .02 G/100ML; G/100ML; G/100ML; G/100ML; G/100ML
125 INJECTION, SOLUTION INTRAVENOUS CONTINUOUS
Status: DISCONTINUED | OUTPATIENT
Start: 2018-09-21 | End: 2018-09-22

## 2018-09-21 RX ORDER — LIDOCAINE HYDROCHLORIDE 20 MG/ML
INJECTION, SOLUTION EPIDURAL; INFILTRATION; INTRACAUDAL; PERINEURAL AS NEEDED
Status: DISCONTINUED | OUTPATIENT
Start: 2018-09-21 | End: 2018-09-22 | Stop reason: HOSPADM

## 2018-09-21 RX ORDER — LIDOCAINE HYDROCHLORIDE 10 MG/ML
1 INJECTION INFILTRATION; PERINEURAL
Status: DISCONTINUED | OUTPATIENT
Start: 2018-09-21 | End: 2018-09-22 | Stop reason: HOSPADM

## 2018-09-21 RX ORDER — ROPIVACAINE HYDROCHLORIDE 2 MG/ML
INJECTION, SOLUTION EPIDURAL; INFILTRATION; PERINEURAL
Status: DISCONTINUED | OUTPATIENT
Start: 2018-09-21 | End: 2018-09-22 | Stop reason: HOSPADM

## 2018-09-21 RX ORDER — BUTORPHANOL TARTRATE 1 MG/ML
1 INJECTION INTRAMUSCULAR; INTRAVENOUS
Status: DISCONTINUED | OUTPATIENT
Start: 2018-09-21 | End: 2018-09-22 | Stop reason: HOSPADM

## 2018-09-21 RX ORDER — SODIUM CHLORIDE 0.9 % (FLUSH) 0.9 %
5-10 SYRINGE (ML) INJECTION EVERY 8 HOURS
Status: DISCONTINUED | OUTPATIENT
Start: 2018-09-21 | End: 2018-09-22

## 2018-09-21 RX ADMIN — SODIUM CHLORIDE, SODIUM LACTATE, POTASSIUM CHLORIDE, CALCIUM CHLORIDE: 600; 310; 30; 20 INJECTION, SOLUTION INTRAVENOUS at 16:04

## 2018-09-21 RX ADMIN — LIDOCAINE HYDROCHLORIDE AND EPINEPHRINE 5 ML: 15; 5 INJECTION, SOLUTION EPIDURAL at 16:16

## 2018-09-21 RX ADMIN — SODIUM CHLORIDE, SODIUM LACTATE, POTASSIUM CHLORIDE, CALCIUM CHLORIDE, AND DEXTROSE MONOHYDRATE 125 ML/HR: 600; 310; 30; 20; 5 INJECTION, SOLUTION INTRAVENOUS at 13:50

## 2018-09-21 RX ADMIN — OXYTOCIN 2 MILLI-UNITS/MIN: 10 INJECTION, SOLUTION INTRAMUSCULAR; INTRAVENOUS at 16:48

## 2018-09-21 RX ADMIN — PENICILLIN G POTASSIUM 2.5 MILLION UNITS: 20000000 POWDER, FOR SOLUTION INTRAVENOUS at 18:56

## 2018-09-21 RX ADMIN — ACETAMINOPHEN 1000 MG: 500 TABLET, FILM COATED ORAL at 21:04

## 2018-09-21 RX ADMIN — PENICILLIN G POTASSIUM 2.5 MILLION UNITS: 20000000 POWDER, FOR SOLUTION INTRAVENOUS at 23:15

## 2018-09-21 RX ADMIN — LIDOCAINE HYDROCHLORIDE 5 ML: 20 INJECTION, SOLUTION EPIDURAL; INFILTRATION; INTRACAUDAL; PERINEURAL at 16:20

## 2018-09-21 RX ADMIN — SODIUM CHLORIDE 5 MILLION UNITS: 900 INJECTION, SOLUTION INTRAVENOUS at 14:55

## 2018-09-21 RX ADMIN — ROPIVACAINE HYDROCHLORIDE 10 ML/HR: 2 INJECTION, SOLUTION EPIDURAL; INFILTRATION; PERINEURAL at 16:21

## 2018-09-21 RX ADMIN — SODIUM CHLORIDE, SODIUM LACTATE, POTASSIUM CHLORIDE, CALCIUM CHLORIDE, AND DEXTROSE MONOHYDRATE 125 ML/HR: 600; 310; 30; 20; 5 INJECTION, SOLUTION INTRAVENOUS at 21:55

## 2018-09-21 NOTE — ANESTHESIA PROCEDURE NOTES
Epidural Block Start time: 9/21/2018 4:10 PM 
End time: 9/21/2018 4:20 PM 
Performed by: She Chang Authorized by: She Chang  
 
Pre-Procedure Indication: labor epidural   
Preanesthetic Checklist: patient identified, risks and benefits discussed, anesthesia consent, patient being monitored, timeout performed and anesthesia consent Timeout Time: 16:10 Epidural:  
Patient position:  Seated Prep region:  Lumbar Prep: Chlorhexidine Location:  L3-4 Needle and Epidural Catheter:  
Needle Type:  Tuohy Needle Gauge:  17 G Injection Technique:  Loss of resistance using air Attempts:  1 Catheter Size:  19 G Catheter at Skin Depth (cm):  15 Depth in Epidural Space (cm):  6 Events: no blood with aspiration, no cerebrospinal fluid with aspiration, no paresthesia and negative aspiration test   
Test Dose:  Lidocaine 1.5% w/ epi and negative Assessment:  
Catheter Secured:  Tape and tegaderm Insertion:  Uncomplicated Patient tolerance:  Patient tolerated the procedure well with no immediate complications

## 2018-09-21 NOTE — ANESTHESIA PREPROCEDURE EVALUATION
Anesthetic History No history of anesthetic complications Review of Systems / Medical History Patient summary reviewed, nursing notes reviewed and pertinent labs reviewed Pulmonary Asthma : well controlled Neuro/Psych Within defined limits Cardiovascular Exercise tolerance: >4 METS 
  
GI/Hepatic/Renal 
  
GERD Endo/Other Obesity Other Findings Physical Exam 
 
Airway Mallampati: III Mouth opening: Normal 
 
 Cardiovascular Regular rate and rhythm,  S1 and S2 normal,  no murmur, click, rub, or gallop Dental 
No notable dental hx Pulmonary Breath sounds clear to auscultation Abdominal 
 
 
 
 Other Findings Anesthetic Plan ASA: 3 Anesthesia type: epidural 
 
 
 
 
 
Anesthetic plan and risks discussed with: Patient

## 2018-09-21 NOTE — H&P
History & Physical 
 
Name: Thomas Mosher MRN: [id number]  SSN: [id number] YOB: 1990  Age: 33 y.o. Sex: female Subjective:  34 yo G1, 39w6d in [place]. Cervical exam in the office today 3-4/80/-2. .. Problem List  Date Reviewed: 9/20/2018 Codes Class Noted Normal labor ICD-10-CM: O80, Z37.9 ICD-9-CM: 119  9/21/2018 Abdominal pain during pregnancy in third trimester ICD-10-CM: O26.893, R10.9 ICD-9-CM: 646.83, 789.00  9/10/2018 Gastroesophageal reflux during pregnancy, antepartum, third trimester ICD-10-CM: O99.613, K21.9 ICD-9-CM: 259.96, 530.81  7/11/2018 Overview Addendum 7/11/2018 10:39 AM by Fredrick Cortes RN  
   
7/11/2018 at [facility]: Pt with increase in nausea and reflux. Using Prilosec daily and states it hasn't been working well. · Script sent for to add zantac BID Pregnancy affected by previous bariatric surgery, currently in third trimester ICD-10-CM: O99.843 ICD-9-CM: 103.78  3/14/2018 Overview Addendum 7/11/2018 10:46 AM by Nikolai Love MD  
  3/14/2018 at [facility]:  Hx of Gastric Sleeve in 4/2017. Lost 117# 
5/21/2018 at [facility]: Patient having right sided pain and will have radiology appt 5/23/2018 for review of gallbladder. 7/11/2018 at [facility]:  Was given Rx for meter June 2018- pt states she has been testing blood sugars and they have been between . Glucose logs given to patient and encouraged to send to [facility] for us to review. · Patient to send logs next week; if >2 elevations during week of testing, then: · Refer to [facility]. · Then refer back to [facility] for DM management to be seen in ~2-3 weeks. Obesity affecting pregnancy in third trimester ICD-10-CM: O99.213 ICD-9-CM: 649.13  3/14/2018 Asthma affecting pregnancy, antepartum ICD-10-CM: O99.519, J45.909 ICD-9-CM: 648.93, 493.90  3/14/2018  Overview Addendum 7/11/2018 10:30 AM by Fredrick Cortes RN  
 3/14/2018 at [facility]:  Stable on Albuterol prn. Pt states only has flairs when \"has a cold\". 4/9/2018 at [facility]:  Stable on Albuterol prn.  
7/11/2018 at [facility]:  stable · Continue Albuterol prn. Maternal sickle cell anemia complicating pregnancy in third trimester ([facility] [address] ICD-10-CM: O99.013, D57.1 ICD-9-CM: 648.23, 282.60  3/14/2018 Overview Addendum 8/13/2018  9:32 AM by Junaid De Leon MD  
  3/14/2018 at [facility]:  Pt with Sickle Cell Trait. FOB states negative. 3/19/2018:  FOB, Jhoan Reed, is negative carrier. Tested 3/13/18. 
7/11/2018 at [facility]:  Patient is at increased risk for asymptomatic bacturia and UTI. Needs screening for UTI with UA and culture at next OB visit 8/8/18 bactiuria  Enterobacter cloacae -10,000-25,000 colony forming units per ml (7/25/18 txd E coli uti). Macrobid bid x 7 d then qd suppression Thickening of nuchal fold ICD-10-CM: O35. 1XX0 
ICD-9-CM: 655.10  3/14/2018 Overview Addendum 7/11/2018 10:47 AM by Shasha Jarvis MD  
  3/14/2018 at [facility]:  NT upper normal limits at 2.7 mm extending to mid back, no septations, not >2 SD above normal but appearance raises clinical suspicion, NB present. Genetic counseling done by physician today. Pt wants NIPT, to primary OB for redraw on 3/20/2018---> low risk Panorama 4/9/2018 at [facility]:  Normal early Anatomy/Fetal Echo. This is reassuring with a negative NIPS. Questions answered. Would like to reevaluate anatomy and echo in 6 weeks. 16 + wk:  declines MSAFP. 
5/21/2018 at [facility]: Appropriate fetal growth without IUGR, Normal fetal echo, reassuring fetal status. No aneuploidy markers seen. %, overall 53%, CHRISTINA  19.5cm, UA Dopplers WNL. Patient and  reassured. Small risk of cardiac defect. 7/11/2018 [facility]: reassuring fetal status. High-risk pregnancy in third trimester ICD-10-CM: O09.93 
ICD-9-CM: V23.9  2/16/2018  Overview Addendum 9/18/2018 10:43 PM by Junaid De Leon MD  
 8 wk (2018):  10 m s/p gastric sleeve ([place]). Has lost 117 lb. LDA  from 12-16 wk. Early glucola per pt doesn't vinod sugar, if not tolerated, alt screen: fasting and postprandial blood sugars for one week. Nutritional status, watch for IUGR 
+ sickle cell trait, FOB status:  pending:  ? urine cx q trimester:  1st trimester neg,  2nd trimester neg,  3rd trimester. No h/o HSV. 12 wk: Advised to start baby asa/stop 36 wk 
2018 at [facility]: Normal repeat echo. AC 40%, overall 54%, CHRISTINA 17cm, BPP , Dopplers WNL 35 wk:  Stopped baby aspirin a a few weeks ago. 40 w: EFW 6 lbs. 10 oz. 37%, AC 21% GBS bacteriuria ICD-10-CM: R82.71 ICD-9-CM: 599.0, 041.02  2018 Overview Signed 2018 11:39 PM by Arian Harris MD  
  Needs GBS PROPHYLAXIS during labor Estimated Date of Delivery: 18 OB History  Para Term  AB Living 1 0 0 0 0 0 SAB TAB Ectopic Molar Multiple Live Births  
0 0 0 0 0 0 # Outcome Date GA Lbr Lucas/2nd Weight Sex Delivery Anes PTL Lv  
1 Current Ms. Mirela Sparks is admitted with pregnancy at 39w6d for active labor. Prenatal course was normal. Please see prenatal records for details. Past Medical History:  
Diagnosis Date  Anemia  Asthma  Sickle cell trait (HCC)   
 trait not disease  Sickle cell trait syndrome ([facility] [address] Past Surgical History:  
Procedure Laterality Date  HX GASTRIC BYPASS  2017  
 sleeve, ? [place]  
 HX OTHER SURGICAL Social History Occupational History  Not on file. Social History Main Topics  Smoking status: Never Smoker  Smokeless tobacco: Never Used  Alcohol use No  
 Drug use: No  
 Sexual activity: Yes  
  Partners: Male Birth control/ protection: None Family History Problem Relation Age of Onset  Hypertension Neg Hx  Diabetes Neg Hx  Cancer Neg Hx  Stroke Neg Hx No Known Allergies Prior to Admission medications Medication Sig Start Date End Date Taking? Authorizing Provider  
nitrofurantoin, macrocrystal-monohydrate, (MACROBID) 100 mg capsule Take one tab twice a day for ten days, then one a day until delivery. 8/13/18  Yes Lay Lopez MD  
raNITIdine (ZANTAC) 150 mg tablet Take 1 Tab by mouth two (2) times a day. Indications: gastroesophageal reflux disease, Heartburn 7/11/18  Yes Julio C Keane MD  
CEG09-Snxx Fumarate-FA-DSS-DHA (PNV-DHA + DOCUSATE) 27-1. 25- mg cap Take 1 Cap by mouth daily. Which ever generic PNV/DHA insurance will cover  Indications: pregnancy 3/9/18  Yes Roxy Bradford MD  
omeprazole (PRILOSEC) 20 mg capsule Take 20 mg by mouth daily. Yes Historical Provider Blood-Glucose Meter monitoring kit Check blood sugar before first meal, an hour after each meal and at bedtime 6/8/18   Roxy Bradford MD  
Lancets misc Check blood sugar before first meal, an hour after each meal and at bedtime 6/8/18   Roxy Bradford MD  
glucose blood VI test strips (ASCENSIA AUTODISC VI, ONE TOUCH ULTRA TEST VI) strip Check blood sugar before first meal, an hour after each meal and at bedtime 6/8/18   Roxy Bradford MD  
acetaminophen (TYLENOL) 325 mg tablet Take  by mouth every four (4) hours as needed for Pain. Historical Provider  
cholecalciferol, vitamin D3, (VITAMIN D3) 2,000 unit tab Take  by mouth. Historical Provider CALCIUM CARBONATE (CALCIUM 500 PO) Take 1,000 mg by mouth. Historical Provider  
ondansetron hcl (ZOFRAN, AS HYDROCHLORIDE,) 8 mg tablet Take 1 Tab by mouth every eight (8) hours as needed for Nausea. 2/15/18   Roxy Bradford MD  
albuterol (PROVENTIL) 5 mg/mL nebulizer solution by Nebulization route once. Historical Provider Review of Systems: A comprehensive review of systems was negative except for that written in the HPI. Objective:  
 
Vitals: 
Vitals:  
 09/21/18 1332 09/21/18 1341 Temp: 98.1 °F (36.7 °C) Weight:  204 lb (92.5 kg) Physical Exam: 
Patient with distress. Heart: Regular rate and rhythm, S1S2 present or without murmur or extra heart sounds Lung: clear to auscultation throughout lung fields, no wheezes, no rales, no rhonchi and normal respiratory effort Fundus: soft and non tender Cervical Exam: 3-4/80/-2 Lower Extremities:  - Edema 1+ 
 - Patellar Reflexes: 1+ bilaterally - Clonus: absent Membranes:  Intact Fetal Heart Rate:  FHTs 120s Prenatal Labs:  
Lab Results Component Value Date/Time  
 Rubella, External Immune 02/05/2018 GrBStrep, External Negative 08/29/2018 HBsAg, External Negative 02/05/2018 HIV, External Negative 02/05/2018 RPR, External Negative 02/05/2018 ABO,Rh O Positive 02/05/2018 Assessment/Plan: Active Problems: 
  Normal labor (9/21/2018) Plan: Admit for Reassuring fetal status, Continue plan for vaginal delivery. Group B Strep was negative. Spoke w/ Dr. Edwige Fregoso, on call doctor, she agrees w/ admission. 9/18/18 cervix was 2/80/-3. Today 3-4/80/-2.   
 
9/5/18:  EFW 6 lb 10, 37%, AC 21% US today:  CHRISTINA 12, BPP 8/8, vertex. Signed By:  Pauline Stanton MD   
 September 21, 2018

## 2018-09-21 NOTE — IP AVS SNAPSHOT
[id number]  [address] 
[phone] Patient: Ariela Vasquez MRN: [id number] DP About your hospitalization You were admitted on:  2018 You last received care in the:  [address] [facility] You were discharged on:  2018 Why you were hospitalized Your primary diagnosis was:  Normal Labor Follow-up Information Follow up With Details Comments Contact Info None   None ([phone]) Patient stated that they have no PCP Armen Early MD In 6 weeks Call and schedule an appointment for a 6 week follow up at [address] [place] [address] 
[phone] Discharge Orders None A check alyssa indicates which time of day the medication should be taken. My Medications START taking these medications Instructions Each Dose to Equal  
 Morning Noon Evening Bedtime HYDROcodone-acetaminophen 5-325 mg per tablet Commonly known as:  [address] Your last dose was: Your next dose is: Take 1-2 Tabs by mouth every four (4) hours as needed. Max Daily Amount: 12 Tabs. Indications: Pain 1-2 Tab CONTINUE taking these medications Instructions Each Dose to Equal  
 Morning Noon Evening Bedtime  
 albuterol 5 mg/mL nebulizer solution Commonly known as:  PROVENTIL Your last dose was: Your next dose is:    
   
   
 by Nebulization route once. CALCIUM 500 PO Your last dose was: Your next dose is: Take 1,000 mg by mouth. 1000 mg  
    
   
   
   
  
 omeprazole 20 mg capsule Commonly known as:  PRILOSEC Your last dose was: Your next dose is: Take 20 mg by mouth daily. 20 mg  
    
   
   
   
  
 ondansetron hcl 8 mg tablet Commonly known as:  Norma Sames Your last dose was: Your next dose is: Take 1 Tab by mouth every eight (8) hours as needed for Nausea. 8 mg PNV66-Iron Fumarate-FA-DSS-DHA 27-1. 25- mg Cap Commonly known as:  PNV-DHA + DOCUSATE Your last dose was: Your next dose is: Take 1 Cap by mouth daily. Which ever generic PNV/DHA insurance will cover  Indications: pregnancy 1 Cap  
    
   
   
   
  
 raNITIdine 150 mg tablet Commonly known as:  ZANTAC Your last dose was: Your next dose is: Take 1 Tab by mouth two (2) times a day. Indications: gastroesophageal reflux disease, Heartburn 150 mg  
    
   
   
   
  
 TYLENOL 325 mg tablet Generic drug:  acetaminophen Your last dose was: Your next dose is: Take  by mouth every four (4) hours as needed for Pain. VITAMIN D3 2,000 unit Tab Generic drug:  cholecalciferol (vitamin D3) Your last dose was: Your next dose is: Take  by mouth. STOP taking these medications Blood-Glucose Meter monitoring kit  
   
  
 glucose blood VI test strips strip Commonly known as:  ASCENSIA AUTODISC VI, ONE TOUCH ULTRA TEST VI Lancets Misc  
   
  
 nitrofurantoin (macrocrystal-monohydrate) 100 mg capsule Commonly known as:  MACROBID Where to Get Your Medications Information on where to get these meds will be given to you by the nurse or doctor. ! Ask your nurse or doctor about these medications HYDROcodone-acetaminophen 5-325 mg per tablet Opioid Education Prescription Opioids: What You Need to Know: 
 
Prescription opioids can be used to help relieve moderate-to-severe pain and are often prescribed following a surgery or injury, or for certain health conditions.   These medications can be an important part of treatment but also come with serious risks. Opioids are strong pain medicines. Examples include hydrocodone, oxycodone, fentanyl, and morphine. Heroin is an example of an illegal opioid. It is important to work with your health care provider to make sure you are getting the safest, most effective care. WHAT ARE THE RISKS AND SIDE EFFECTS OF OPIOID USE? Prescription opioids carry serious risks of addiction and overdose, especially with prolonged use. An opioid overdose, often marked by slow breathing, can cause sudden death. The use of prescription opioids can have a number of side effects as well, even when taken as directed. · Tolerance-meaning you might need to take more of a medication for the same pain relief · Physical dependence-meaning you have symptoms of withdrawal when the medication is stopped. Withdrawal symptoms can include nausea, sweating, chills, diarrhea, stomach cramps, and muscle aches. Withdrawal can last up to several weeks, depending on which drug you took and how long you took it. · Increased sensitivity to pain · Constipation · Nausea, vomiting, and dry mouth · Sleepiness and dizziness · Confusion · Depression · Low levels of testosterone that can result in lower sex drive, energy, and strength · Itching and sweating RISKS ARE GREATER WITH:      
· History of drug misuse, substance use disorder, or overdose · Mental health conditions (such as depression or anxiety) · Sleep apnea · Older age (72 years or older) · Pregnancy Avoid alcohol while taking prescription opioids. Also, unless specifically advised by your health care provider, medications to avoid include: · Benzodiazepines (such as Xanax or Valium) · Muscle relaxants (such as Soma or Flexeril) · Hypnotics (such as Ambien or Lunesta) · Other prescription opioids KNOW YOUR OPTIONS Talk to your health care provider about ways to manage your pain that don't involve prescription opioids. Some of these options may actually work better and have fewer risks and side effects. Consult your physician before adding or stopping any medications, treatments, or physical activity. Options may include: 
· Pain relievers such as acetaminophen, ibuprofen, and naproxen · Some medications that are also used for depression or seizures · Physical therapy and exercise · Counseling to help patients learn how to cope better with triggers of pain and stress. · Application of heat or cold compress · Massage therapy · Relaxation techniques Be Informed Make sure you know the name of your medication, how much and how often to take it, and its potential risks & side effects. IF YOU ARE PRESCRIBED OPIOIDS FOR PAIN: 
· Never take opioids in greater amounts or more often than prescribed. Remember the goal is not to be pain-free but to manage your pain at a tolerable level. · Follow up with your primary care provider to: · Work together to create a plan on how to manage your pain. · Talk about ways to help manage your pain that don't involve prescription opioids. · Talk about any and all concerns and side effects. · Help prevent misuse and abuse. · Never sell or share prescription opioids · Help prevent misuse and abuse. · Store prescription opioids in a secure place and out of reach of others (this may include visitors, children, friends, and family). · Safely dispose of unused/unwanted prescription opioids: Find your community drug take-back program or your pharmacy mail-back program, or flush them down the toilet, following guidance from the Food and Drug Administration (www.fda.gov/Drugs/ResourcesForYou). · Visit www.cdc.gov/drugoverdose to learn about the risks of opioid abuse and overdose. · If you believe you may be struggling with addiction, tell your health care provider and ask for guidance or call [employer] at 3-552-574-TQRG. Discharge Instructions Vaginal Childbirth: Care Instructions Your Care Instructions Your body will slowly heal in the next few weeks. It is easy to get too tired and overwhelmed during the first weeks after your baby is born. Changes in your hormones can shift your mood without warning. You may find it hard to meet the extra demands on your energy and time. Take it easy on yourself. Follow-up care is a key part of your treatment and safety. Be sure to make and go to all appointments, and call your doctor if you are having problems. It's also a good idea to know your test results and keep a list of the medicines you take. How can you care for yourself at home? · Vaginal bleeding and cramps ¨ After delivery, you will have a bloody discharge from the vagina. This will turn pink within a week and then white or yellow after about 10 days. It may last for 2 to 4 weeks or longer, until the uterus has healed. Use pads instead of tampons until you stop bleeding. ¨ Do not worry if you pass some blood clots, as long as they are smaller than a golf ball. If you have a tear or stitches in your vaginal area, change the pad at least every 4 hours to prevent soreness and infection. ¨ You may have cramps for the first few days after childbirth. These are normal and occur as the uterus shrinks to normal size. Take an over-the-counter pain medicine, such as acetaminophen (Tylenol), ibuprofen (Advil, Motrin), or naproxen (Aleve), for cramps. Read and follow all instructions on the label. Do not take aspirin, because it can cause more bleeding. ¨ Do not take two or more pain medicines at the same time unless the doctor told you to. Many pain medicines have acetaminophen, which is Tylenol. Too much acetaminophen (Tylenol) can be harmful. · Stitches ¨ If you have stitches, they will dissolve on their own and do not need to be removed. Follow your doctor's instructions for cleaning the stitched area. ¨ Put ice or a cold pack on your painful area for 10 to 20 minutes at a time, several times a day, for the first few days. Put a thin cloth between the ice and your skin. ¨ Sit in a few inches of warm water (sitz bath) 3 times a day and after bowel movements. The warm water helps with pain and itching. If you do not have a tub, a warm shower might help. · Breast fullness ¨ Your breasts may overfill (engorge) in the first few days after delivery. To help milk flow and to relieve pain, warm your breasts in the shower or by using warm, moist towels before nursing. ¨ If you are not nursing, do not put warmth on your breasts or touch your breasts. Wear a tight bra or sports bra and use ice until the fullness goes away. This usually takes 2 to 3 days. ¨ Put ice or a cold pack on your breast after nursing to reduce swelling and pain. Put a thin cloth between the ice and your skin. · Activity ¨ Eat a balanced diet. Do not try to lose weight by cutting calories. Keep taking your prenatal vitamins, or take a multivitamin. ¨ Get as much rest as you can. Try to take naps when your baby sleeps during the day. ¨ Get some exercise every day. But do not do any heavy exercise until your doctor says it is okay. ¨ Wait until you are healed (about 4 to 6 weeks) before you have sexual intercourse. Your doctor will tell you when it is okay to have sex. ¨ Talk to your doctor about birth control. You can get pregnant even before your period returns. Also, you can get pregnant while you are breastfeeding. · Mental health ¨ It is normal to have some sadness, anxiety, sleeplessness, and mood swings after you go home. If you feel upset or hopeless for more than a few days or are having trouble doing the things you need to do, talk to your doctor. · Constipation and hemorrhoids ¨ Drink plenty of fluids, enough so that your urine is light yellow or clear like water. If you have kidney, heart, or liver disease and have to limit fluids, talk with your doctor before you increase the amount of fluids you drink. ¨ Eat plenty of fiber each day. Have a bran muffin or bran cereal for breakfast, and try eating a piece of fruit for a mid-afternoon snack. ¨ For painful, itchy hemorrhoids, put ice or a cold pack on the area several times a day for 10 minutes at a time. Follow this by putting a warm compress on the area for another 10 to 20 minutes or by sitting in a shallow, warm bath. When should you call for help? Call 911 anytime you think you may need emergency care. For example, call if: 
  · You passed out (lost consciousness).  
 Call your doctor now or seek immediate medical care if: 
  · You have severe vaginal bleeding.  
  · You are dizzy or lightheaded, or you feel like you may faint.  
  · You have a fever.  
  · You have new or more pain in your belly or pelvis.  
 Watch closely for changes in your health, and be sure to contact your doctor if: 
  · Your vaginal bleeding seems to be getting heavier.  
  · You have new or worse vaginal discharge.  
  · You feel sad, anxious, or hopeless for more than a few days.  
  · You do not get better as expected. Where can you learn more? Go to http://ifeoma-pancho.info/. Enter C342 in the search box to learn more about \"Vaginal Childbirth: Care Instructions. \" Current as of: November 21, 2017 Content Version: 11.7 © 8264-7928 [employer]. Care instructions adapted under license by [employer] (which disclaims liability or warranty for this information). If you have questions about a medical condition or this instruction, always ask your healthcare professional. [address] [address] any warranty or liability for your use of this information. Introducing [facility]! Dear Vannesa Delgado: Thank you for requesting a [employer] One account. Our records indicate that you already have an active [employer] One account. You can access your account anytime at [email or web address]. [facility] [place]/[employer] Did you know that you can access your hospital and ER discharge instructions at any time in Health Plan One? You can also review all of your test results from your hospital stay or ER visit. Additional Information If you have questions, please visit the Frequently Asked Questions section of the [employer] Plan One website at [email or web address]. [facility] [place]/[employer]/. Remember, Health Plan One is NOT to be used for urgent needs. For medical emergencies, dial 911. Now available from your iPhone and Android! Introducing Dawson Brody As a [facility] patient, I wanted to make you aware of our electronic visit tool called aDwson Brody. [employer] allows you to connect within minutes with a medical provider 24 hours a day, seven days a week via a mobile device or tablet or logging into a secure website from your computer. You can access Dawson Brody from anywhere in the [place]. A virtual visit might be right for you when you have a simple condition and feel like you just dont want to get out of bed, or cant get away from work for an appointment, when your regular [facility] provider is not available (evenings, weekends or holidays), or when youre out of town and need minor care. Electronic visits cost only $49 and if the [employer] provider determines a prescription is needed to treat your condition, one can be electronically transmitted to a nearby pharmacy*. Please take a moment to enroll today if you have not already done so. The enrollment process is free and takes just a few minutes. To enroll, please download the [employer] ruiz to your tablet or phone, or visit [email or web address] to enroll on your computer. And, as an [address] [place] [address] patient with a [employer] account, the results of your visits will be scanned into your electronic medical record and your primary care provider will be able to view the scanned results. We urge you to continue to see your regular [employer] provider for your ongoing medical care. And while your primary care provider may not be the one available when you seek a Dawson Thorntonfin virtual visit, the peace of mind you get from getting a real diagnosis real time can be priceless. For more information on Dawson Thorntonfin, view our Frequently Asked Questions (FAQs) at [email or web address]. Sincerely, 
 
Hadley Tompkins MD 
Chief Medical Officer Neil Ai Lou *:  certain medications cannot be prescribed via [employer] Providers Seen During Your Hospitalization Provider Specialty Primary office phone Juan Vital MD Obstetrics & Gynecology [phone] Immunizations Administered for This Admission Name Date Influenza Vaccine (Quad) PF  Deferred () Tdap  Deferred (),  Deferred () Your Primary Care Physician (PCP) Primary Care Physician Office Phone Office Fax NONE ** None ** ** None ** You are allergic to the following No active allergies Recent Documentation Weight Breastfeeding? BMI OB Status Smoking Status 92.5 kg Yes 36.14 kg/m2 Recent pregnancy Never Smoker Patient Belongings The following personal items are in your possession at time of discharge: 
  Dental Appliances: None  Visual Aid: At home, Contacts, Glasses      Home Medications: None   Jewelry: None  Clothing: At bedside, Undergarments, Footwear, Shirt, Pants    Other Valuables: At bedside, Cell Phone Please provide this summary of care documentation to your next provider.  
  
  
 
  
Signatures-by signing, you are acknowledging that this After Visit Summary has been reviewed with you and you have received a copy. Patient Signature:  ____________________________________________________________ Date:  ____________________________________________________________  
  
Evie Pane Provider Signature:  ____________________________________________________________ Date:  ____________________________________________________________

## 2018-09-22 LAB
BASE DEFICIT BLDCOA-SCNC: 2.1 MMOL/L (ref 0–2)
BASE DEFICIT BLDCOV-SCNC: 1.3 MMOL/L (ref 1.9–7.7)
BDY SITE: ABNORMAL
BDY SITE: ABNORMAL
HCO3 BLDCOA-SCNC: 22 MMOL/L (ref 22–26)
HCO3 BLDV-SCNC: 23 MMOL/L
PCO2 BLDCOA: 35 MMHG (ref 33–49)
PCO2 BLDCOV: 37 MMHG (ref 14.1–43.3)
PH BLDCOA: 7.42 [PH] (ref 7.21–7.31)
PH BLDCOV: 7.41 [PH] (ref 7.2–7.44)
PO2 BLDCOA: 45 MMHG (ref 9–19)
PO2 BLDV: 39 MMHG (ref 30.4–57.2)
SERVICE CMNT-IMP: ABNORMAL
SERVICE CMNT-IMP: ABNORMAL

## 2018-09-22 PROCEDURE — 0UQGXZZ REPAIR VAGINA, EXTERNAL APPROACH: ICD-10-PCS | Performed by: OBSTETRICS & GYNECOLOGY

## 2018-09-22 PROCEDURE — 82803 BLOOD GASES ANY COMBINATION: CPT

## 2018-09-22 PROCEDURE — 77030003028 HC SUT VCRL J&J -A

## 2018-09-22 PROCEDURE — 65270000029 HC RM PRIVATE

## 2018-09-22 PROCEDURE — 75410000003 HC RECOV DEL/VAG/CSECN EA 0.5 HR

## 2018-09-22 PROCEDURE — 75410000002 HC LABOR FEE PER 1 HR

## 2018-09-22 PROCEDURE — 4A1HXCZ MONITORING OF PRODUCTS OF CONCEPTION, CARDIAC RATE, EXTERNAL APPROACH: ICD-10-PCS | Performed by: OBSTETRICS & GYNECOLOGY

## 2018-09-22 PROCEDURE — 10907ZC DRAINAGE OF AMNIOTIC FLUID, THERAPEUTIC FROM PRODUCTS OF CONCEPTION, VIA NATURAL OR ARTIFICIAL OPENING: ICD-10-PCS | Performed by: OBSTETRICS & GYNECOLOGY

## 2018-09-22 PROCEDURE — 76060000078 HC EPIDURAL ANESTHESIA

## 2018-09-22 PROCEDURE — 75410000000 HC DELIVERY VAGINAL/SINGLE

## 2018-09-22 PROCEDURE — 77030018846 HC SOL IRR STRL H20 ICUM -A

## 2018-09-22 PROCEDURE — 74011250637 HC RX REV CODE- 250/637: Performed by: OBSTETRICS & GYNECOLOGY

## 2018-09-22 PROCEDURE — 0UQMXZZ REPAIR VULVA, EXTERNAL APPROACH: ICD-10-PCS | Performed by: OBSTETRICS & GYNECOLOGY

## 2018-09-22 PROCEDURE — 77030011943

## 2018-09-22 RX ORDER — SIMETHICONE 80 MG
80 TABLET,CHEWABLE ORAL
Status: DISCONTINUED | OUTPATIENT
Start: 2018-09-22 | End: 2018-09-24 | Stop reason: HOSPADM

## 2018-09-22 RX ORDER — IBUPROFEN 800 MG/1
800 TABLET ORAL
Status: DISCONTINUED | OUTPATIENT
Start: 2018-09-22 | End: 2018-09-24 | Stop reason: HOSPADM

## 2018-09-22 RX ORDER — DIPHENHYDRAMINE HCL 25 MG
25-50 CAPSULE ORAL
Status: DISCONTINUED | OUTPATIENT
Start: 2018-09-22 | End: 2018-09-24 | Stop reason: HOSPADM

## 2018-09-22 RX ORDER — ZOLPIDEM TARTRATE 5 MG/1
5 TABLET ORAL
Status: DISCONTINUED | OUTPATIENT
Start: 2018-09-22 | End: 2018-09-24 | Stop reason: HOSPADM

## 2018-09-22 RX ORDER — HYDROCODONE BITARTRATE AND ACETAMINOPHEN 5; 325 MG/1; MG/1
1-2 TABLET ORAL
Status: DISCONTINUED | OUTPATIENT
Start: 2018-09-22 | End: 2018-09-24 | Stop reason: HOSPADM

## 2018-09-22 RX ORDER — OXYTOCIN/RINGER'S LACTATE 30/500 ML
250 PLASTIC BAG, INJECTION (ML) INTRAVENOUS ONCE
Status: ACTIVE | OUTPATIENT
Start: 2018-09-22 | End: 2018-09-22

## 2018-09-22 RX ORDER — NALOXONE HYDROCHLORIDE 0.4 MG/ML
0.4 INJECTION, SOLUTION INTRAMUSCULAR; INTRAVENOUS; SUBCUTANEOUS AS NEEDED
Status: DISCONTINUED | OUTPATIENT
Start: 2018-09-22 | End: 2018-09-24 | Stop reason: HOSPADM

## 2018-09-22 RX ORDER — OXYTOCIN/RINGER'S LACTATE 30/500 ML
250 PLASTIC BAG, INJECTION (ML) INTRAVENOUS
Status: DISCONTINUED | OUTPATIENT
Start: 2018-09-22 | End: 2018-09-22

## 2018-09-22 RX ORDER — DOCUSATE SODIUM 100 MG/1
100 CAPSULE, LIQUID FILLED ORAL 2 TIMES DAILY
Status: DISCONTINUED | OUTPATIENT
Start: 2018-09-22 | End: 2018-09-24 | Stop reason: HOSPADM

## 2018-09-22 RX ADMIN — WITCH HAZEL 1 PAD: 500 SOLUTION RECTAL; TOPICAL at 06:20

## 2018-09-22 RX ADMIN — HYDROCODONE BITARTRATE AND ACETAMINOPHEN 1 TABLET: 5; 325 TABLET ORAL at 04:07

## 2018-09-22 RX ADMIN — HYDROCODONE BITARTRATE AND ACETAMINOPHEN 1 TABLET: 5; 325 TABLET ORAL at 22:15

## 2018-09-22 RX ADMIN — HYDROCODONE BITARTRATE AND ACETAMINOPHEN 1 TABLET: 5; 325 TABLET ORAL at 10:03

## 2018-09-22 RX ADMIN — DOCUSATE SODIUM 100 MG: 100 CAPSULE, LIQUID FILLED ORAL at 10:03

## 2018-09-22 RX ADMIN — HYDROCODONE BITARTRATE AND ACETAMINOPHEN 1 TABLET: 5; 325 TABLET ORAL at 17:04

## 2018-09-22 RX ADMIN — SIMETHICONE CHEW TAB 80 MG 80 MG: 80 TABLET ORAL at 18:44

## 2018-09-22 RX ADMIN — DOCUSATE SODIUM 100 MG: 100 CAPSULE, LIQUID FILLED ORAL at 17:04

## 2018-09-22 NOTE — LACTATION NOTE
This note was copied from a baby's chart. Mom has been working to get baby to latch. Last ate ~ 6 hours ago. Attempted in football on L, no latch. Discussed mom may need to start pumping since getting close to 24 hours and baby has been inconsistent at breast.  Put pump in room, but baby got on in football on L and was nursing fair at this time. Encouraged mom to watch for consistency in second 24 hours. Can pump if not latching. Encouraged lots of skin sot skin especially prior to feeding time to help wake for feedings. Will follow.

## 2018-09-22 NOTE — PROGRESS NOTES
SBAR IN Report: Mother Verbal report received from Chad Callahan on this patient, who is now being transferred from labor and delivery for routine progression of care. The patient is not wearing a green \"Anesthesia-Duramorph\" band. Report consisted of patient's Situation, Background, Assessment and Recommendations (SBAR).  ID bands were compared with the identification form, and verified with the patient and transferring nurse. Information from the SBAR, Procedure Summary, Intake/Output, MAR and Recent Results and the Bharathi Report was reviewed with the transferring nurse; opportunity for questions and clarification provided.

## 2018-09-22 NOTE — PROGRESS NOTES
Report received from [address] [place] [address] Drive. Care assumed. Denies needs at present.

## 2018-09-22 NOTE — PROGRESS NOTES
SBAR OUT Report: Mother Verbal report given to Maternity RN on this patient, who is now being transferred to MIU (unit) for routine progression of care. The patient is not wearing a green \"Anesthesia-Duramorph\" band. Report consisted of patient's Situation, Background, Assessment and Recommendations (SBAR).  ID bands were compared with the identification form, and verified with the patient and receiving nurse. Information from the SBAR, Intake/Output and [facility] opportunity for questions and clarification provided.

## 2018-09-22 NOTE — ANESTHESIA POSTPROCEDURE EVALUATION
Anesthesiology Epidural Followup Note S/p vaginal delivery via continuous labor epidural.  Patient had adequate pain control during labor and delivery, and she is currently without complaints. No residual motor or sensory deficit. No apparent anesthetic complications. Post-Anesthesia Evaluation and Assessment Patient: Noella Hamman MRN: [id number]  SSN: [id number] YOB: 1990  Age: 33 y.o. Sex: female Cardiovascular Function/Vital Signs Visit Vitals  /62 (BP 1 Location: Right arm, BP Patient Position: At rest)  Pulse 85  Temp 36.7 °C (98.1 °F)  Resp 20  Wt 92.5 kg (204 lb)  Breastfeeding Yes  BMI 36.14 kg/m2 Patient is status post epidural anesthesia for * No procedures listed *. Nausea/Vomiting: None Postoperative hydration reviewed and adequate. Pain: 
Pain Scale 1: Numeric (0 - 10) (09/22/18 0500) Pain Intensity 1: 0 (09/22/18 0500) Managed Neurological Status: At baseline Mental Status and Level of Consciousness: Alert and oriented Pulmonary Status:  
O2 Device: Room air (09/22/18 0300) Adequate oxygenation and airway patent Complications related to anesthesia: None Post-anesthesia assessment completed. No concerns Signed By: Leslie Cruz MD   
 September 22, 2018

## 2018-09-22 NOTE — PROGRESS NOTES
Maternal position was changed from LL to [id number] [facility] Dinora hernandez was updated on FHR trace of variables no new orders

## 2018-09-22 NOTE — LACTATION NOTE

## 2018-09-22 NOTE — PROGRESS NOTES
Labor Progress Note Continue Labor Patient seen, fetal heart rate and contraction pattern evaluated, patient examined. Patient Vitals for the past 1 hrs: 
 BP Temp Pulse  
18 106/66 98.1 °F (36.7 °C) 83  
18 110/71 - 78 Fetal Monitoring:  reactive Uterine Activity: every 2 minutes Dilation: 7 cm Effacement: [de-identified] 
Station: -1 Assessment/Plan: 
AROM performed with clear fluid Continue plan for  Leslee Morrison MD 
8:47 PM

## 2018-09-22 NOTE — L&D DELIVERY NOTE
Delivery Summary    Patient: Doug Marie MRN: [id number]  SSN: [id number]    YOB: 1990  Age: 33 y.o. Sex: female       Information for the patient's :  Irina Ott [[id number]]       Labor Events:    Labor: No   Rupture Date: 2018   Rupture Time: 8:40 PM   Rupture Type AROM   Amniotic Fluid Volume: Moderate    Amniotic Fluid Description: Clear None   Induction: None       Augmentation: Oxytocin;AROM   Labor Events: None     Cervical Ripening:     None     Delivery Events:  Episiotomy: None   Laceration(s): Right labial;Left labial;Vaginal     Repaired: Yes    Number of Repair Packets: 2   Suture Type and Size: Vicryl 3-0     Estimated Blood Loss (ml): 400ml       Delivery Date: 2018    Delivery Time: 12:31 AM  Delivery Type: Vaginal, Spontaneous Delivery  Sex:  Male     Gestational Age: 37w0d   Delivery Clinician:  Darell Spear  Living Status: Living   Delivery Location: &D 434          APGARS  One minute Five minutes Ten minutes   Skin color: 1   1        Heart rate: 2   2        Grimace: 2   2        Muscle tone: 2   2        Breathin   2        Totals: 9   9            Presentation: Vertex    Position:   Occiput Anterior  Resuscitation Method:  Tactile Stimulation;Suctioning-bulb     Meconium Stained:        Cord Vessels: 3 Vessels      Cord Events:    Cord Blood Sent?:  Yes    Blood Gases Sent?:  Yes    Placenta:  Date/Time:  12:40 AM  Removal: Spontaneous      Appearance: Normal;Intact     [place] Measurements:  Birth Weight: 7 lb 6.2 oz (3.35 kg)      Birth Length: 20.2 cm      Head Circumference: 34.3 cm      Chest Circumference:       Abdominal Girth:       Other Providers:   GARRET Cordova;;, Obstetrician;Primary Nurse;Primary [place] Nurse;Nicu Nurse;Neonatologist           Group B Strep:   Lab Results   Component Value Date/Time    GrBStrep, External Negative 2018     Information for the patient's :  Kermit Vanegas [[id number]]     Lab Results   Component Value Date/Time    ABO/Rh(D) O POSITIVE 2018 12:31 AM    LUCILLE IgG NEG 2018 12:31 AM     Lab Results   Component Value Date/Time    APH 7.416 (H) 2018 12:31 AM    APCO2 35 2018 12:31 AM    APO2 45 (H) 2018 12:31 AM    AHCO3 22 2018 12:31 AM    ABDC 2.1 (H) 2018 12:31 AM    EPHV 7.409 2018 12:31 AM    PCO2V 37 2018 12:31 AM    PO2V 39 2018 12:31 AM    HCO3V 23 2018 12:31 AM    EBDV 1.3 (L) 2018 12:31 AM    SITE CORD 2018 12:31 AM    SITE CORD 2018 12:31 AM    RSCOM NA at 2018 12 45 46 AM. Not read back. 2018 12:31 AM    RSCOM NA at 2018 12 48 05 AM. Not read back. 2018 12:31 AM      Delivery Note        Lab Results   Component Value Date/Time    PH,CORD BLD ARTERIAL 7.416 (H) 2018 12:31 AM    PCO2,CORD BLD ARTERIAL 35 2018 12:31 AM    PO2,CORD BLD ARTERIAL 45 (H) 2018 12:31 AM    HCO3,CORD BLD ARTERIAL 22 2018 12:31 AM    BASE DEFICIT,CBA 2.1 (H) 2018 12:31 AM    SITE CORD 2018 12:31 AM    SITE CORD 2018 12:31 AM    Respiratory comment: NA at 2018 12 45 46 AM. Not read back. 2018 12:31 AM    Respiratory comment: NA at 2018 12 48 05 AM. Not read back. 2018 12:31 AM            Lab Results   Component Value Date/Time    Rubella, External Immune 2018    GrBStrep, External Negative 2018            Procedure:  Patient became completely dilated and pushed. Episiotomy was not performed. Patient delivered head. Mouth and nares suctioned on the perineum with bulb syringe. Shoulders delivered without any evidence of dystocia. Baby delivered in the bed, was dried. The cord was clamped and cut. Cord pH and cord blood was obtained. The baby was placed on mom in a dry blanket or towel. The perineum was examined.   She had multiple superficial lacerations on the labia repaired with interrupted sutures of 3.0 vicryl. She had one vaginal laceration repaired with 3.0 vicryl. The placenta was delivered spontaneously. It was examined. It was intact with three vessels. Mom and baby are doing well.          Belkys Swenson MD  9/22/2018  1:38 AM

## 2018-09-22 NOTE — LACTATION NOTE
This note was copied from a baby's chart. Assisted with breastfeeding in football on L and R. Also attempted in cross cradle. Baby fed fair, sleepy. A bit shallow, but stayed on fairly well. Demonstrated manual lip flange. Encouraged frequent feeding and watch output. Reviewed first 24 hours.

## 2018-09-23 PROCEDURE — 65270000029 HC RM PRIVATE

## 2018-09-23 PROCEDURE — 74011250637 HC RX REV CODE- 250/637: Performed by: OBSTETRICS & GYNECOLOGY

## 2018-09-23 RX ORDER — ACETAMINOPHEN 500 MG
1000 TABLET ORAL
Status: DISCONTINUED | OUTPATIENT
Start: 2018-09-23 | End: 2018-09-24 | Stop reason: HOSPADM

## 2018-09-23 RX ADMIN — DOCUSATE SODIUM 100 MG: 100 CAPSULE, LIQUID FILLED ORAL at 08:18

## 2018-09-23 RX ADMIN — ACETAMINOPHEN 1000 MG: 500 TABLET, FILM COATED ORAL at 12:26

## 2018-09-23 RX ADMIN — HYDROCODONE BITARTRATE AND ACETAMINOPHEN 1 TABLET: 5; 325 TABLET ORAL at 08:19

## 2018-09-23 RX ADMIN — Medication 1 SPRAY: at 12:26

## 2018-09-23 RX ADMIN — DOCUSATE SODIUM 100 MG: 100 CAPSULE, LIQUID FILLED ORAL at 18:12

## 2018-09-23 RX ADMIN — HYDROCODONE BITARTRATE AND ACETAMINOPHEN 1 TABLET: 5; 325 TABLET ORAL at 03:57

## 2018-09-23 RX ADMIN — HYDROCODONE BITARTRATE AND ACETAMINOPHEN 1 TABLET: 5; 325 TABLET ORAL at 19:21

## 2018-09-23 RX ADMIN — HYDROCODONE BITARTRATE AND ACETAMINOPHEN 1 TABLET: 5; 325 TABLET ORAL at 23:26

## 2018-09-23 NOTE — LACTATION NOTE
This note was copied from a baby's chart. Mom reports feedings going well. Baby woke well and cluster fed last night. Mom did not need to start pumping. No problems or questions. Will call out today as needed. Encouraged to watch output and frequent feedings. Wake as needed for feedings during the day today.

## 2018-09-23 NOTE — PROGRESS NOTES
33 yr-old F gave birth to baby boy - has named him Neo Ignacio. FOB is Ashely Wheatley. This is patient's first child. Provided [facility] Home Visit brochure. [facility] Patient reports she has support at home. [facility]. Has all needs. Has PCP. Provided Provided Postpartum Depression packet.

## 2018-09-23 NOTE — PROGRESS NOTES
Progress Note Patient: Sharita Penaloza MRN: [id number]  SSN: [id number] YOB: 1990  Age: 33 y.o. Sex: female Postpartum Day Number 1 Subjective:  
 
Patient doing well postpartum without significant complaints. Voiding without difficulty. Patient reports normal lochia. . Breastfeeding: Yes Objective:  
 
Patient Vitals for the past 18 hrs: 
 Temp Pulse Resp BP SpO2  
18 0848 97.9 °F (36.6 °C) 91 18 114/69 97 % 18 2049 97.4 °F (36.3 °C) 81 18 113/75 98 % Temp (24hrs), Av.7 °F (36.5 °C), Min:97.4 °F (36.3 °C), Max:97.9 °F (36.6 °C) Physical Exam:   
Patient without distress. Currently ambulating with baby. Lab/Data Review: All lab results for the last 24 hours reviewed. GBS:  
Lab Results Component Value Date/Time GrBStrep, External Negative 2018 Blood Type:  
Lab Results Component Value Date/Time ABO/Rh(D) O POSITIVE 2018 01:42 PM  
 ABO,Rh O Positive 2018 Assessment and Plan:  
 
Patient appears to be having an uncomplicated postpartum course. Continue routine perineal care and maternal education. Signed By: Danny Sadler MD   
 2018

## 2018-09-24 VITALS
SYSTOLIC BLOOD PRESSURE: 103 MMHG | HEART RATE: 89 BPM | OXYGEN SATURATION: 97 % | DIASTOLIC BLOOD PRESSURE: 63 MMHG | WEIGHT: 204 LBS | RESPIRATION RATE: 16 BRPM | TEMPERATURE: 97.8 F | BODY MASS INDEX: 36.14 KG/M2

## 2018-09-24 PROCEDURE — 74011250637 HC RX REV CODE- 250/637: Performed by: OBSTETRICS & GYNECOLOGY

## 2018-09-24 RX ORDER — HYDROCODONE BITARTRATE AND ACETAMINOPHEN 5; 325 MG/1; MG/1
1-2 TABLET ORAL
Qty: 15 TAB | Refills: 0 | Status: SHIPPED | OUTPATIENT
Start: 2018-09-24 | End: 2018-11-05

## 2018-09-24 RX ADMIN — HYDROCODONE BITARTRATE AND ACETAMINOPHEN 1 TABLET: 5; 325 TABLET ORAL at 06:06

## 2018-09-24 RX ADMIN — HYDROCODONE BITARTRATE AND ACETAMINOPHEN 1 TABLET: 5; 325 TABLET ORAL at 10:05

## 2018-09-24 RX ADMIN — DOCUSATE SODIUM 100 MG: 100 CAPSULE, LIQUID FILLED ORAL at 10:05

## 2018-09-24 NOTE — DISCHARGE INSTRUCTIONS
Vaginal Childbirth: Care Instructions  Your Care Instructions    Your body will slowly heal in the next few weeks. It is easy to get too tired and overwhelmed during the first weeks after your baby is born. Changes in your hormones can shift your mood without warning. You may find it hard to meet the extra demands on your energy and time. Take it easy on yourself. Follow-up care is a key part of your treatment and safety. Be sure to make and go to all appointments, and call your doctor if you are having problems. It's also a good idea to know your test results and keep a list of the medicines you take. How can you care for yourself at home? · Vaginal bleeding and cramps  ¨ After delivery, you will have a bloody discharge from the vagina. This will turn pink within a week and then white or yellow after about 10 days. It may last for 2 to 4 weeks or longer, until the uterus has healed. Use pads instead of tampons until you stop bleeding. ¨ Do not worry if you pass some blood clots, as long as they are smaller than a golf ball. If you have a tear or stitches in your vaginal area, change the pad at least every 4 hours to prevent soreness and infection. ¨ You may have cramps for the first few days after childbirth. These are normal and occur as the uterus shrinks to normal size. Take an over-the-counter pain medicine, such as acetaminophen (Tylenol), ibuprofen (Advil, Motrin), or naproxen (Aleve), for cramps. Read and follow all instructions on the label. Do not take aspirin, because it can cause more bleeding. ¨ Do not take two or more pain medicines at the same time unless the doctor told you to. Many pain medicines have acetaminophen, which is Tylenol. Too much acetaminophen (Tylenol) can be harmful. · Stitches  ¨ If you have stitches, they will dissolve on their own and do not need to be removed. Follow your doctor's instructions for cleaning the stitched area.   ¨ Put ice or a cold pack on your painful area for 10 to 20 minutes at a time, several times a day, for the first few days. Put a thin cloth between the ice and your skin. ¨ Sit in a few inches of warm water (sitz bath) 3 times a day and after bowel movements. The warm water helps with pain and itching. If you do not have a tub, a warm shower might help. · Breast fullness  ¨ Your breasts may overfill (engorge) in the first few days after delivery. To help milk flow and to relieve pain, warm your breasts in the shower or by using warm, moist towels before nursing. ¨ If you are not nursing, do not put warmth on your breasts or touch your breasts. Wear a tight bra or sports bra and use ice until the fullness goes away. This usually takes 2 to 3 days. ¨ Put ice or a cold pack on your breast after nursing to reduce swelling and pain. Put a thin cloth between the ice and your skin. · Activity  ¨ Eat a balanced diet. Do not try to lose weight by cutting calories. Keep taking your prenatal vitamins, or take a multivitamin. ¨ Get as much rest as you can. Try to take naps when your baby sleeps during the day. ¨ Get some exercise every day. But do not do any heavy exercise until your doctor says it is okay. ¨ Wait until you are healed (about 4 to 6 weeks) before you have sexual intercourse. Your doctor will tell you when it is okay to have sex. ¨ Talk to your doctor about birth control. You can get pregnant even before your period returns. Also, you can get pregnant while you are breastfeeding. · Mental health  ¨ It is normal to have some sadness, anxiety, sleeplessness, and mood swings after you go home. If you feel upset or hopeless for more than a few days or are having trouble doing the things you need to do, talk to your doctor. · Constipation and hemorrhoids  ¨ Drink plenty of fluids, enough so that your urine is light yellow or clear like water.  If you have kidney, heart, or liver disease and have to limit fluids, talk with your doctor before you increase the amount of fluids you drink. ¨ Eat plenty of fiber each day. Have a bran muffin or bran cereal for breakfast, and try eating a piece of fruit for a mid-afternoon snack. ¨ For painful, itchy hemorrhoids, put ice or a cold pack on the area several times a day for 10 minutes at a time. Follow this by putting a warm compress on the area for another 10 to 20 minutes or by sitting in a shallow, warm bath. When should you call for help? Call 911 anytime you think you may need emergency care. For example, call if:    · You passed out (lost consciousness).    Call your doctor now or seek immediate medical care if:    · You have severe vaginal bleeding.     · You are dizzy or lightheaded, or you feel like you may faint.     · You have a fever.     · You have new or more pain in your belly or pelvis.    Watch closely for changes in your health, and be sure to contact your doctor if:    · Your vaginal bleeding seems to be getting heavier.     · You have new or worse vaginal discharge.     · You feel sad, anxious, or hopeless for more than a few days.     · You do not get better as expected. Where can you learn more? Go to http://ifeoma-pancho.info/. Enter N426 in the search box to learn more about \"Vaginal Childbirth: Care Instructions. \"  Current as of: November 21, 2017  Content Version: 11.7  © 2246-4879 [employer]. Care instructions adapted under license by [employer] (which disclaims liability or warranty for this information). If you have questions about a medical condition or this instruction, always ask your healthcare professional. [place] [address] any warranty or liability for your use of this information.

## 2018-09-24 NOTE — PROGRESS NOTES
Progress Note Patient: Geovany Carson MRN: [id number]  SSN: [id number] YOB: 1990  Age: 33 y.o. Sex: female Postpartum Day Number 2 Subjective:  
 
Patient doing well postpartum without significant complaints. Voiding without difficulty. Patient reports normal lochia. .  Pain well controlled, voiding on her own without difficulty. Breast feeding. Denies HA, SOB/CP, RUQ pain, Vision changes. Hx gastric sleeve--reports unable to take NSAIDS. Objective:  
 
Patient Vitals for the past 12 hrs: 
 Temp Pulse Resp BP SpO2  
18 0855 97.8 °F (36.6 °C) 89 16 103/63 97 % Temp (24hrs), Av.8 °F (36.6 °C), Min:97.8 °F (36.6 °C), Max:97.8 °F (36.6 °C) Physical Exam:   
Constitutional: She appears well-developed and well-nourished. No distress. HENT:   
Head: Normocephalic and atraumatic. Cardiovascular: RRR Pulmonary/Chest: CTAB Abd:  S/NTTP/ND, BS normoactive, fundus firm at umbilicus Ext:  No c/c/e Lab/Data Review: CBC:   
Recent Labs  
   18 
 1342 WBC  9.6 HGB  11.3* HCT  33.8*  
PLT  318 GBS:  
Lab Results Component Value Date/Time GrBStrep, External Negative 2018 Blood Type:  
Lab Results Component Value Date/Time ABO/Rh(D) O POSITIVE 2018 01:42 PM  
 ABO,Rh O Positive 2018 Assessment and Plan:  
 
33 y.o.  ppd# 2 s/p : 
 
1) PP:  Meeting all pp goals, continue routine care; appropriate for DC home today 2) Breast feeding, Rh pos, Rub imm Signed By: Zipporah Lanes, MD   
 2018

## 2018-09-25 NOTE — PROGRESS NOTES
Referral made to [facility]  home visit program. 
  
Kristina Tyler, [address] [facility]

## 2018-09-28 NOTE — DISCHARGE SUMMARY
Obstetrical Discharge Summary     Name: Octavia Yañez MRN: [id number]  SSN: [id number]    YOB: 1990  Age: 33 y.o. Sex: female      Allergies: Review of patient's allergies indicates no known allergies. Admit Date: 2018    Discharge Date: 2018     Admitting Physician: Aamir Sparrow MD     Attending Physician:  No att. providers found     * Admission Diagnoses: labor;Normal labor    * Discharge Diagnoses:   Information for the patient's :  Deborah Lee [[id number]]   Delivery of a 7 lb 6.2 oz (3.35 kg) male infant via Vaginal, Spontaneous Delivery on 2018 at 12:31 AM  by . Apgars were 9 and 9. Additional Diagnoses:   Hospital Problems as of 2018  Date Reviewed: 2018          Codes Class Noted - Resolved POA    * (Principal)Normal labor ICD-10-CM: O80, Z37.9  ICD-9-CM: 548  2018 - Present Yes        Gastroesophageal reflux during pregnancy, antepartum, third trimester ICD-10-CM: O99.613, K21.9  ICD-9-CM: 646.83, 530.81  2018 - Present     Overview Addendum 2018 10:39 AM by Leonardo Munroe RN       2018 at [facility]: Pt with increase in nausea and reflux. Using Prilosec daily and states it hasn't been working well. · Script sent for to add zantac BID             Pregnancy affected by previous bariatric surgery, currently in third trimester ICD-10-CM: O99.843  ICD-9-CM: 649.23  3/14/2018 - Present Yes    Overview Addendum 2018 10:46 AM by Adrian Cortez MD     3/14/2018 at [facility]:  Hx of Gastric Sleeve in 2017. Lost 117#  2018 at [facility]: Patient having right sided pain and will have radiology appt 2018 for review of gallbladder. 2018 at [facility]:  Was given Rx for meter 2018- pt states she has been testing blood sugars and they have been between . Glucose logs given to patient and encouraged to send to [facility] for us to review.   · Patient to send logs next week; if >2 elevations during week of testing, then:  · Refer to [facility]. · Then refer back to [facility] for DM management to be seen in ~2-3 weeks. Asthma affecting pregnancy, antepartum ICD-10-CM: O99.519, J45.909  ICD-9-CM: 648.93, 493.90  3/14/2018 - Present Yes    Overview Addendum 7/11/2018 10:30 AM by Anais Layton RN     3/14/2018 at [facility]:  Stable on Albuterol prn. Pt states only has flairs when \"has a cold\". 4/9/2018 at [facility]:  Stable on Albuterol prn.   7/11/2018 at [facility]:  stable    · Continue Albuterol prn. Maternal sickle cell anemia complicating pregnancy in third trimester [facility]) ICD-10-CM: O99.013, D57.1  ICD-9-CM: 648.23, 282.60  3/14/2018 - Present Yes    Overview Addendum 8/13/2018  9:32 AM by Mir Khan MD     3/14/2018 at [facility]:  Pt with Sickle Cell Trait. FOB states negative. 3/19/2018:  FOB, Miguel Steiner, is negative carrier. Tested 3/13/18.  7/11/2018 at [facility]:  Patient is at increased risk for asymptomatic bacturia and UTI. Needs screening for UTI with UA and culture at next OB visit    8/8/18 bactiuria  Enterobacter cloacae -10,000-25,000 colony forming units per ml (7/25/18 txd E coli uti). Macrobid bid x 7 d then qd suppression             High-risk pregnancy in third trimester ICD-10-CM: O09.93  ICD-9-CM: V23.9  2/16/2018 - Present Yes    Overview Addendum 9/18/2018 10:43 PM by Mir Khan MD     8 wk (2/2018):  10 m s/p gastric sleeve ([place]). Has lost 117 lb. LDA  from 12-16 wk. Early glucola per pt doesn't vinod sugar, if not tolerated, alt screen: fasting and postprandial blood sugars for one week. Nutritional status, watch for IUGR  + sickle cell trait, FOB status:  pending:  ? urine cx q trimester:  1st trimester neg,  2nd trimester neg,  3rd trimester. No h/o HSV. 12 wk: Advised to start baby asa/stop 36 wk  7/11/2018 at [facility]: Normal repeat echo. AC 40%, overall 54%, CHRISTINA 17cm, BPP 8/8, Dopplers WNL  35 wk:  Stopped baby aspirin a a few weeks ago. 40 w: EFW 6 lbs.  10 oz. 37%, AC 21% Lab Results   Component Value Date/Time    ABO/Rh(D) O POSITIVE 2018 01:42 PM    Rubella, External Immune 2018    GrBStrep, External Negative 2018    ABO,Rh O Positive 2018    There is no immunization history for the selected administration types on file for this patient. * Procedures:      [place]  Depression Scale  I have been able to laugh and see the funny side of things: As much as I always could  I have looked forward with enjoyment to things: As much as I ever did  I have blamed myself unnecessarily when things went wrong: Not very often  I have been anxious or worried for no good reason: Hardly ever  I have felt scared or panicky for no very good reason: Yes, sometimes  Things have been getting on top of me: No, most of the time I have coped quite well  I have been so unhappy that I have had difficulty sleeping: No, not at all  I have felt sad or miserable: Not very often  I have been so unhappy that I have been crying: No, never  The thought of harming myself has occurred to me: Never  Total Score: 6    * Discharge Condition: good    * Hospital Course: Normal hospital course following the delivery. * Disposition: Home    Discharge Medications:   Discharge Medication List as of 2018 10:33 AM      START taking these medications    Details   HYDROcodone-acetaminophen (NORCO) 5-325 mg per tablet Take 1-2 Tabs by mouth every four (4) hours as needed. Max Daily Amount: 12 Tabs. Indications: Pain, Print, Disp-15 Tab, R-0         CONTINUE these medications which have NOT CHANGED    Details   raNITIdine (ZANTAC) 150 mg tablet Take 1 Tab by mouth two (2) times a day.  Indications: gastroesophageal reflux disease, Heartburn, Normal, Disp-60 Tab, R-5      acetaminophen (TYLENOL) 325 mg tablet Take  by mouth every four (4) hours as needed for Pain., Historical Med      cholecalciferol, vitamin D3, (VITAMIN D3) 2,000 unit tab Take  by mouth., Historical Med      CALCIUM CARBONATE (CALCIUM 500 PO) Take 1,000 mg by mouth., Historical Med      PNV66-Iron Fumarate-FA-DSS-DHA (PNV-DHA + DOCUSATE) 27-1. 25- mg cap Take 1 Cap by mouth daily. Which ever generic PNV/DHA insurance will cover  Indications: pregnancy, Phone In, Disp-90 Cap, R-3      ondansetron hcl (ZOFRAN, AS HYDROCHLORIDE,) 8 mg tablet Take 1 Tab by mouth every eight (8) hours as needed for Nausea., Normal, Disp-30 Tab, R-3      omeprazole (PRILOSEC) 20 mg capsule Take 20 mg by mouth daily. , Historical Med      albuterol (PROVENTIL) 5 mg/mL nebulizer solution by Nebulization route once., Historical Med         STOP taking these medications       nitrofurantoin, macrocrystal-monohydrate, (MACROBID) 100 mg capsule Comments:   Reason for Stopping:         Blood-Glucose Meter monitoring kit Comments:   Reason for Stopping:         Lancets misc Comments:   Reason for Stopping:         glucose blood VI test strips (ASCENSIA AUTODISC VI, ONE TOUCH ULTRA TEST VI) strip Comments:   Reason for Stopping:               * Follow-up Care/Patient Instructions:   Activity: No sex for 6 weeks, No driving while on analgesics and No heavy lifting for 6 weeks  Diet: Regular Diet  Wound Care: Keep wound clean and dry    Follow-up Information     Follow up With Details Comments Contact Info    None   None [phone]) Patient stated that they have no PCP      Kiel Marrero MD In 6 weeks Call and schedule an appointment for a 6 week follow up at [address]  [phone]             Signed By:  Kiel Marrero MD     September 28, 2018

## 2019-06-11 PROBLEM — K21.9 GASTROESOPHAGEAL REFLUX DURING PREGNANCY, ANTEPARTUM, THIRD TRIMESTER: Status: RESOLVED | Noted: 2018-07-11 | Resolved: 2019-06-11

## 2019-06-11 PROBLEM — O09.93 HIGH-RISK PREGNANCY IN THIRD TRIMESTER: Status: RESOLVED | Noted: 2018-02-16 | Resolved: 2019-06-11

## 2019-06-11 PROBLEM — R82.71 GBS BACTERIURIA: Status: RESOLVED | Noted: 2018-02-07 | Resolved: 2019-06-11

## 2019-06-11 PROBLEM — O99.613 GASTROESOPHAGEAL REFLUX DURING PREGNANCY, ANTEPARTUM, THIRD TRIMESTER: Status: RESOLVED | Noted: 2018-07-11 | Resolved: 2019-06-11

## 2019-06-11 PROBLEM — O09.90 HIGH RISK PREGNANCY, ANTEPARTUM: Status: ACTIVE | Noted: 2019-06-11

## 2019-06-11 PROBLEM — J45.909 ASTHMA AFFECTING PREGNANCY, ANTEPARTUM: Status: RESOLVED | Noted: 2018-03-14 | Resolved: 2019-06-11

## 2019-06-11 PROBLEM — D57.1 MATERNAL SICKLE CELL ANEMIA COMPLICATING PREGNANCY IN THIRD TRIMESTER (HCC): Status: RESOLVED | Noted: 2018-03-14 | Resolved: 2019-06-11

## 2019-06-11 PROBLEM — O99.843 PREGNANCY AFFECTED BY PREVIOUS BARIATRIC SURGERY, CURRENTLY IN THIRD TRIMESTER: Status: RESOLVED | Noted: 2018-03-14 | Resolved: 2019-06-11

## 2019-06-11 PROBLEM — O99.013 MATERNAL SICKLE CELL ANEMIA COMPLICATING PREGNANCY IN THIRD TRIMESTER (HCC): Status: RESOLVED | Noted: 2018-03-14 | Resolved: 2019-06-11

## 2019-06-11 PROBLEM — O99.519 ASTHMA AFFECTING PREGNANCY, ANTEPARTUM: Status: RESOLVED | Noted: 2018-03-14 | Resolved: 2019-06-11

## 2019-07-24 PROBLEM — O09.92 HIGH-RISK PREGNANCY IN SECOND TRIMESTER: Status: ACTIVE | Noted: 2019-06-11

## 2019-07-24 PROBLEM — O99.212 OBESITY AFFECTING PREGNANCY IN SECOND TRIMESTER: Status: ACTIVE | Noted: 2019-07-24

## 2019-07-24 PROBLEM — Z37.9 NORMAL LABOR: Status: RESOLVED | Noted: 2018-09-21 | Resolved: 2019-07-24

## 2019-07-26 PROBLEM — O36.5990 POOR FETAL GROWTH, AFFECTING MANAGEMENT OF MOTHER, ANTEPARTUM CONDITION OR COMPLICATION: Status: ACTIVE | Noted: 2019-07-26

## 2019-07-26 PROBLEM — O35.9XX0 SUSPECTED FETAL ABNORMALITY AFFECTING MANAGEMENT OF MOTHER: Status: ACTIVE | Noted: 2019-07-26

## 2019-08-14 PROBLEM — O99.213 OBESITY AFFECTING PREGNANCY IN THIRD TRIMESTER: Status: ACTIVE | Noted: 2019-07-24

## 2019-08-14 PROBLEM — O09.93 HIGH-RISK PREGNANCY IN THIRD TRIMESTER: Status: ACTIVE | Noted: 2019-06-11

## 2019-10-11 ENCOUNTER — HOSPITAL ENCOUNTER (EMERGENCY)
Age: 29
Discharge: HOME | DRG: 560 | End: 2019-10-11
Attending: OBSTETRICS & GYNECOLOGY | Admitting: OBSTETRICS & GYNECOLOGY
Payer: MEDICAID

## 2019-10-11 PROCEDURE — 59025 FETAL NON-STRESS TEST: CPT

## 2019-10-11 NOTE — DISCHARGE INSTRUCTIONS
Patient Education        Pregnancy Precautions: Care Instructions  Your Care Instructions    There is no sure way to prevent labor before your due date ( labor) or to prevent most other pregnancy problems. But there are things you can do to increase your chances of a healthy pregnancy. Go to your appointments, follow your doctor's advice, and take good care of yourself. Eat well, and exercise (if your doctor agrees). And make sure to drink plenty of water. Follow-up care is a key part of your treatment and safety. Be sure to make and go to all appointments, and call your doctor if you are having problems. It's also a good idea to know your test results and keep a list of the medicines you take. How can you care for yourself at home? · Make sure you go to your prenatal appointments. At each visit, your doctor will check your blood pressure. Your doctor will also check to see if you have protein in your urine. High blood pressure and protein in urine are signs of preeclampsia. This condition can be dangerous for you and your baby. · Drink plenty of fluids, enough so that your urine is light yellow or clear like water. Dehydration can cause contractions. If you have kidney, heart, or liver disease and have to limit fluids, talk with your doctor before you increase the amount of fluids you drink. · Tell your doctor right away if you notice any symptoms of an infection, such as:  ? Burning when you urinate. ? A foul-smelling discharge from your vagina. ? Vaginal itching. ? Unexplained fever. ? Unusual pain or soreness in your uterus or lower belly. · Eat a balanced diet. Include plenty of foods that are high in calcium and iron. ? Foods high in calcium include milk, cheese, yogurt, almonds, and broccoli. ? Foods high in iron include red meat, shellfish, poultry, eggs, beans, raisins, whole-grain bread, and leafy green vegetables. · Do not smoke.  If you need help quitting, talk to your doctor about stop-smoking programs and medicines. These can increase your chances of quitting for good. · Do not drink alcohol or use illegal drugs. · Follow your doctor's directions about activity. Your doctor will let you know how much, if any, exercise you can do. · Ask your doctor if you can have sex. If you are at risk for early labor, your doctor may ask you to not have sex. · Take care to prevent falls. During pregnancy, your joints are loose, and your balance is off. Sports such as bicycling, skiing, or in-line skating can increase your risk of falling. And don't ride horses or motorcycles, dive, water ski, scuba dive, or parachute jump while you are pregnant. · Avoid getting very hot. Do not use saunas or hot tubs. Avoid staying out in the sun in hot weather for long periods. Take acetaminophen (Tylenol) to lower a high fever. · Do not take any over-the-counter or herbal medicines or supplements without talking to your doctor or pharmacist first.  When should you call for help? Call 911 anytime you think you may need emergency care. For example, call if:    · You passed out (lost consciousness).     · You have a seizure.     · You have severe vaginal bleeding.     · You have severe pain in your belly or pelvis.     · You have had fluid gushing or leaking from your vagina and you know or think the umbilical cord is bulging into your vagina. If this happens, immediately get down on your knees so your rear end (buttocks) is higher than your head. This will decrease the pressure on the cord until help arrives.   [facility] your doctor now or seek immediate medical care if:    · You have signs of preeclampsia, such as:  ? Sudden swelling of your face, hands, or feet. ? New vision problems (such as dimness, blurring, or seeing spots).   ? A severe headache.     · You have any vaginal bleeding.     · You have belly pain or cramping.     · You have a fever.     · You have had regular contractions (with or without pain) for an hour. This means that you have 8 or more within 1 hour or 4 or more in 20 minutes after you change your position and drink fluids.     · You have a sudden release of fluid from your vagina.     · You have low back pain or pelvic pressure that does not go away.     · You notice that your baby has stopped moving or is moving much less than normal.    Watch closely for changes in your health, and be sure to contact your doctor if you have any problems. Where can you learn more? Go to http://ifeoma-pancho.info/. Enter [id number] in the search box to learn more about \"Pregnancy Precautions: Care Instructions. \"  Current as of: May 29, 2019  Content Version: 12.2  © 5803-3875 [email or web address]. Care instructions adapted under license by [employer] (which disclaims liability or warranty for this information). If you have questions about a medical condition or this instruction, always ask your healthcare professional. [address] any warranty or liability for your use of this information.

## 2019-10-14 ENCOUNTER — HOSPITAL ENCOUNTER (INPATIENT)
Age: 29
LOS: 3 days | Discharge: HOME | DRG: 560 | End: 2019-10-17
Attending: OBSTETRICS & GYNECOLOGY | Admitting: OBSTETRICS & GYNECOLOGY
Payer: MEDICAID

## 2019-10-14 DIAGNOSIS — R52 POSTPARTUM PAIN: Primary | ICD-10-CM

## 2019-10-14 PROBLEM — Z3A.38 38 WEEKS GESTATION OF PREGNANCY: Status: ACTIVE | Noted: 2019-10-14

## 2019-10-14 PROBLEM — Z34.90 ENCOUNTER FOR PLANNED INDUCTION OF LABOR: Status: ACTIVE | Noted: 2019-10-14

## 2019-10-14 PROBLEM — O36.5990 IUGR (INTRAUTERINE GROWTH RESTRICTION) AFFECTING CARE OF MOTHER: Status: ACTIVE | Noted: 2019-10-14

## 2019-10-14 LAB
ABO + RH BLD: NORMAL
BLOOD GROUP ANTIBODIES SERPL: NORMAL
ERYTHROCYTE [DISTWIDTH] IN BLOOD BY AUTOMATED COUNT: 14.6 % (ref 11.9–14.6)
HCT VFR BLD AUTO: 30.7 % (ref 35.8–46.3)
HGB BLD-MCNC: 10 G/DL (ref 11.7–15.4)
MCH RBC QN AUTO: 26 PG (ref 26.1–32.9)
MCHC RBC AUTO-ENTMCNC: 32.6 G/DL (ref 31.4–35)
MCV RBC AUTO: 79.9 FL (ref 79.6–97.8)
NRBC # BLD: 0 K/UL (ref 0–0.2)
PLATELET # BLD AUTO: 347 K/UL (ref 150–450)
PMV BLD AUTO: 10.3 FL (ref 9.4–12.3)
RBC # BLD AUTO: 3.84 M/UL (ref 4.05–5.2)
SPECIMEN EXP DATE BLD: NORMAL
WBC # BLD AUTO: 10.5 K/UL (ref 4.3–11.1)

## 2019-10-14 PROCEDURE — 85027 COMPLETE CBC AUTOMATED: CPT

## 2019-10-14 PROCEDURE — 65270000029 HC RM PRIVATE

## 2019-10-14 PROCEDURE — 74011250637 HC RX REV CODE- 250/637: Performed by: OBSTETRICS & GYNECOLOGY

## 2019-10-14 PROCEDURE — 86900 BLOOD TYPING SEROLOGIC ABO: CPT

## 2019-10-14 RX ORDER — MINERAL OIL
120 OIL (ML) ORAL
Status: COMPLETED | OUTPATIENT
Start: 2019-10-14 | End: 2019-10-15

## 2019-10-14 RX ORDER — ZOLPIDEM TARTRATE 5 MG/1
5 TABLET ORAL
Status: DISCONTINUED | OUTPATIENT
Start: 2019-10-14 | End: 2019-10-15

## 2019-10-14 RX ORDER — OXYTOCIN/RINGER'S LACTATE 30/500 ML
0-25 PLASTIC BAG, INJECTION (ML) INTRAVENOUS
Status: DISCONTINUED | OUTPATIENT
Start: 2019-10-14 | End: 2019-10-15

## 2019-10-14 RX ORDER — SODIUM CHLORIDE 0.9 % (FLUSH) 0.9 %
5-40 SYRINGE (ML) INJECTION AS NEEDED
Status: DISCONTINUED | OUTPATIENT
Start: 2019-10-14 | End: 2019-10-15

## 2019-10-14 RX ORDER — ONDANSETRON 2 MG/ML
4 INJECTION INTRAMUSCULAR; INTRAVENOUS
Status: DISCONTINUED | OUTPATIENT
Start: 2019-10-14 | End: 2019-10-15

## 2019-10-14 RX ORDER — BUTORPHANOL TARTRATE 2 MG/ML
1 INJECTION INTRAMUSCULAR; INTRAVENOUS
Status: DISCONTINUED | OUTPATIENT
Start: 2019-10-14 | End: 2019-10-15 | Stop reason: HOSPADM

## 2019-10-14 RX ORDER — OXYTOCIN/RINGER'S LACTATE 15/250 ML
250 PLASTIC BAG, INJECTION (ML) INTRAVENOUS ONCE
Status: ACTIVE | OUTPATIENT
Start: 2019-10-14 | End: 2019-10-15

## 2019-10-14 RX ORDER — LIDOCAINE HYDROCHLORIDE 20 MG/ML
JELLY TOPICAL
Status: DISCONTINUED | OUTPATIENT
Start: 2019-10-14 | End: 2019-10-15 | Stop reason: HOSPADM

## 2019-10-14 RX ORDER — DEXTROSE, SODIUM CHLORIDE, SODIUM LACTATE, POTASSIUM CHLORIDE, AND CALCIUM CHLORIDE 5; .6; .31; .03; .02 G/100ML; G/100ML; G/100ML; G/100ML; G/100ML
125 INJECTION, SOLUTION INTRAVENOUS CONTINUOUS
Status: DISCONTINUED | OUTPATIENT
Start: 2019-10-14 | End: 2019-10-15

## 2019-10-14 RX ORDER — LIDOCAINE HYDROCHLORIDE 10 MG/ML
1 INJECTION INFILTRATION; PERINEURAL
Status: DISCONTINUED | OUTPATIENT
Start: 2019-10-14 | End: 2019-10-15 | Stop reason: HOSPADM

## 2019-10-14 RX ORDER — SODIUM CHLORIDE 0.9 % (FLUSH) 0.9 %
5-40 SYRINGE (ML) INJECTION EVERY 8 HOURS
Status: DISCONTINUED | OUTPATIENT
Start: 2019-10-14 | End: 2019-10-15

## 2019-10-14 RX ADMIN — ZOLPIDEM TARTRATE 5 MG: 5 TABLET ORAL at 23:54

## 2019-10-14 RX ADMIN — MISOPROSTOL 25 MCG: 100 TABLET ORAL at 21:20

## 2019-10-15 ENCOUNTER — ANESTHESIA (OUTPATIENT)
Dept: LABOR AND DELIVERY | Age: 29
DRG: 560 | End: 2019-10-15
Payer: MEDICAID

## 2019-10-15 ENCOUNTER — ANESTHESIA EVENT (OUTPATIENT)
Dept: LABOR AND DELIVERY | Age: 29
DRG: 560 | End: 2019-10-15
Payer: MEDICAID

## 2019-10-15 LAB
ARTERIAL PATENCY WRIST A: ABNORMAL
ARTERIAL PATENCY WRIST A: ABNORMAL
BASE DEFICIT BLD-SCNC: 1 MMOL/L
BASE DEFICIT BLD-SCNC: 1 MMOL/L
BDY SITE: ABNORMAL
BDY SITE: ABNORMAL
BODY TEMPERATURE: 98.6
BODY TEMPERATURE: 98.6
CO2 BLD-SCNC: 25 MMOL/L
CO2 BLD-SCNC: 27 MMOL/L
COLLECT TIME,HTIME: 1639
COLLECT TIME,HTIME: 1639
GAS FLOW.O2 O2 DELIVERY SYS: ABNORMAL L/MIN
GAS FLOW.O2 O2 DELIVERY SYS: ABNORMAL L/MIN
HCO3 BLD-SCNC: 26 MMOL/L (ref 22–26)
HCO3 BLDV-SCNC: 23.8 MMOL/L (ref 23–28)
PCO2 BLDCO: 41 MMHG (ref 32–68)
PCO2 BLDCO: 50 MMHG (ref 32–68)
PH BLDCO: 7.32 [PH] (ref 7.15–7.38)
PH BLDCO: 7.37 [PH] (ref 7.15–7.38)
PO2 BLDCO: 19 MMHG
PO2 BLDCO: 34 MMHG
SAO2 % BLD: 26 % (ref 95–98)
SAO2 % BLDV: 64 % (ref 65–88)
SERVICE CMNT-IMP: ABNORMAL
SERVICE CMNT-IMP: ABNORMAL
SPECIMEN TYPE: ABNORMAL
SPECIMEN TYPE: ABNORMAL

## 2019-10-15 PROCEDURE — 76060000078 HC EPIDURAL ANESTHESIA

## 2019-10-15 PROCEDURE — 4A1HXCZ MONITORING OF PRODUCTS OF CONCEPTION, CARDIAC RATE, EXTERNAL APPROACH: ICD-10-PCS | Performed by: OBSTETRICS & GYNECOLOGY

## 2019-10-15 PROCEDURE — 10907ZC DRAINAGE OF AMNIOTIC FLUID, THERAPEUTIC FROM PRODUCTS OF CONCEPTION, VIA NATURAL OR ARTIFICIAL OPENING: ICD-10-PCS | Performed by: OBSTETRICS & GYNECOLOGY

## 2019-10-15 PROCEDURE — 75410000003 HC RECOV DEL/VAG/CSECN EA 0.5 HR

## 2019-10-15 PROCEDURE — A4300 CATH IMPL VASC ACCESS PORTAL: HCPCS | Performed by: ANESTHESIOLOGY

## 2019-10-15 PROCEDURE — 3E033VJ INTRODUCTION OF OTHER HORMONE INTO PERIPHERAL VEIN, PERCUTANEOUS APPROACH: ICD-10-PCS | Performed by: OBSTETRICS & GYNECOLOGY

## 2019-10-15 PROCEDURE — 74011250637 HC RX REV CODE- 250/637: Performed by: OBSTETRICS & GYNECOLOGY

## 2019-10-15 PROCEDURE — 74011250636 HC RX REV CODE- 250/636: Performed by: OBSTETRICS & GYNECOLOGY

## 2019-10-15 PROCEDURE — 3E0P7VZ INTRODUCTION OF HORMONE INTO FEMALE REPRODUCTIVE, VIA NATURAL OR ARTIFICIAL OPENING: ICD-10-PCS | Performed by: OBSTETRICS & GYNECOLOGY

## 2019-10-15 PROCEDURE — 74011250636 HC RX REV CODE- 250/636

## 2019-10-15 PROCEDURE — 82803 BLOOD GASES ANY COMBINATION: CPT

## 2019-10-15 PROCEDURE — 75410000002 HC LABOR FEE PER 1 HR

## 2019-10-15 PROCEDURE — 77030018846 HC SOL IRR STRL H20 ICUM -A

## 2019-10-15 PROCEDURE — 77030014125 HC TY EPDRL BBMI -B: Performed by: ANESTHESIOLOGY

## 2019-10-15 PROCEDURE — 74011000250 HC RX REV CODE- 250

## 2019-10-15 PROCEDURE — 74011000258 HC RX REV CODE- 258: Performed by: OBSTETRICS & GYNECOLOGY

## 2019-10-15 PROCEDURE — 75410000000 HC DELIVERY VAGINAL/SINGLE

## 2019-10-15 PROCEDURE — 77030011943

## 2019-10-15 PROCEDURE — 77030005518 HC CATH URETH FOL 2W BARD -B

## 2019-10-15 PROCEDURE — 65270000029 HC RM PRIVATE

## 2019-10-15 RX ORDER — NALOXONE HYDROCHLORIDE 0.4 MG/ML
0.4 INJECTION, SOLUTION INTRAMUSCULAR; INTRAVENOUS; SUBCUTANEOUS AS NEEDED
Status: DISCONTINUED | OUTPATIENT
Start: 2019-10-15 | End: 2019-10-17 | Stop reason: HOSPADM

## 2019-10-15 RX ORDER — OXYCODONE AND ACETAMINOPHEN 5; 325 MG/1; MG/1
1 TABLET ORAL
Status: DISCONTINUED | OUTPATIENT
Start: 2019-10-15 | End: 2019-10-17 | Stop reason: HOSPADM

## 2019-10-15 RX ORDER — ROPIVACAINE HYDROCHLORIDE 2 MG/ML
INJECTION, SOLUTION EPIDURAL; INFILTRATION; PERINEURAL AS NEEDED
Status: DISCONTINUED | OUTPATIENT
Start: 2019-10-15 | End: 2019-10-15 | Stop reason: HOSPADM

## 2019-10-15 RX ORDER — ONDANSETRON 2 MG/ML
8 INJECTION INTRAMUSCULAR; INTRAVENOUS
Status: DISCONTINUED | OUTPATIENT
Start: 2019-10-15 | End: 2019-10-17 | Stop reason: HOSPADM

## 2019-10-15 RX ORDER — BISACODYL 5 MG
5 TABLET, DELAYED RELEASE (ENTERIC COATED) ORAL DAILY PRN
Status: DISCONTINUED | OUTPATIENT
Start: 2019-10-15 | End: 2019-10-17 | Stop reason: HOSPADM

## 2019-10-15 RX ORDER — DIPHENHYDRAMINE HCL 25 MG
25 CAPSULE ORAL
Status: DISCONTINUED | OUTPATIENT
Start: 2019-10-15 | End: 2019-10-17 | Stop reason: HOSPADM

## 2019-10-15 RX ORDER — FENTANYL CITRATE 50 UG/ML
INJECTION, SOLUTION INTRAMUSCULAR; INTRAVENOUS AS NEEDED
Status: DISCONTINUED | OUTPATIENT
Start: 2019-10-15 | End: 2019-10-15 | Stop reason: HOSPADM

## 2019-10-15 RX ORDER — ACETAMINOPHEN 500 MG
1000 TABLET ORAL
Status: DISCONTINUED | OUTPATIENT
Start: 2019-10-15 | End: 2019-10-17 | Stop reason: HOSPADM

## 2019-10-15 RX ORDER — LIDOCAINE HYDROCHLORIDE AND EPINEPHRINE 15; 5 MG/ML; UG/ML
INJECTION, SOLUTION EPIDURAL
Status: COMPLETED | OUTPATIENT
Start: 2019-10-15 | End: 2019-10-15

## 2019-10-15 RX ORDER — IBUPROFEN 800 MG/1
800 TABLET ORAL
Status: DISCONTINUED | OUTPATIENT
Start: 2019-10-15 | End: 2019-10-17 | Stop reason: HOSPADM

## 2019-10-15 RX ORDER — ROPIVACAINE HYDROCHLORIDE 2 MG/ML
INJECTION, SOLUTION EPIDURAL; INFILTRATION; PERINEURAL
Status: DISCONTINUED | OUTPATIENT
Start: 2019-10-15 | End: 2019-10-15 | Stop reason: HOSPADM

## 2019-10-15 RX ADMIN — ROPIVACAINE HYDROCHLORIDE 8 ML/HR: 2 INJECTION, SOLUTION EPIDURAL; INFILTRATION; PERINEURAL at 13:22

## 2019-10-15 RX ADMIN — DIPHENHYDRAMINE HYDROCHLORIDE 25 MG: 25 CAPSULE ORAL at 20:00

## 2019-10-15 RX ADMIN — SODIUM CHLORIDE 5 MILLION UNITS: 900 INJECTION, SOLUTION INTRAVENOUS at 09:02

## 2019-10-15 RX ADMIN — OXYTOCIN 2 MILLI-UNITS/MIN: 10 INJECTION, SOLUTION INTRAMUSCULAR; INTRAVENOUS at 09:49

## 2019-10-15 RX ADMIN — ROPIVACAINE HYDROCHLORIDE 5 ML: 2 INJECTION, SOLUTION EPIDURAL; INFILTRATION; PERINEURAL at 13:22

## 2019-10-15 RX ADMIN — PENICILLIN G POTASSIUM 2.5 MILLION UNITS: 20000000 POWDER, FOR SOLUTION INTRAVENOUS at 13:27

## 2019-10-15 RX ADMIN — LIDOCAINE HYDROCHLORIDE AND EPINEPHRINE 4.5 ML: 15; 5 INJECTION, SOLUTION EPIDURAL at 13:19

## 2019-10-15 RX ADMIN — MISOPROSTOL 25 MCG: 100 TABLET ORAL at 05:30

## 2019-10-15 RX ADMIN — OXYCODONE HYDROCHLORIDE AND ACETAMINOPHEN 1 TABLET: 5; 325 TABLET ORAL at 21:26

## 2019-10-15 RX ADMIN — FENTANYL CITRATE 100 MCG: 50 INJECTION, SOLUTION INTRAMUSCULAR; INTRAVENOUS at 16:06

## 2019-10-15 RX ADMIN — MINERAL OIL 120 ML: 471.95 OIL ORAL at 16:50

## 2019-10-15 RX ADMIN — MISOPROSTOL 25 MCG: 100 TABLET ORAL at 01:14

## 2019-10-15 RX ADMIN — SODIUM CHLORIDE, SODIUM LACTATE, POTASSIUM CHLORIDE, CALCIUM CHLORIDE, AND DEXTROSE MONOHYDRATE 125 ML/HR: 600; 310; 30; 20; 5 INJECTION, SOLUTION INTRAVENOUS at 09:00

## 2019-10-15 NOTE — PROGRESS NOTES
Dr. Alix Corrales called and updated on pt status. MD updated on SVE 0/50/-3 and hx of GBS in urine. Per GBS protocol guidlines, order for PCN to be started due to hx of GBS in urine. Also orders for Ambien 5mg, Zofran 4mg, and Pepcid 20mg.

## 2019-10-15 NOTE — L&D DELIVERY NOTE
Delivery Summary    Patient: Dilma Garcia MRN: [id number]  SSN: [id number]    YOB: 1990  Age: 30 y.o. Sex: female       Information for the patient's :  Alisha Patino [[id number]]       Labor Events:    Labor: No    Steroids: None   Cervical Ripening Date/Time:       Cervical Ripening Type: Misoprostol   Antibiotics During Labor: Yes   Rupture Identifier:      Rupture Date/Time: 10/15/2019 1:49 PM   Rupture Type: AROM   Amniotic Fluid Volume: Moderate    Amniotic Fluid Description: Clear    Amniotic Fluid Odor: None    Induction: AROM       Induction Date/Time:        Indications for Induction: IUGR    Augmentation: Oxytocin;AROM   Augmentation Date/Time:      Indications for Augmentation: Ineffective Contraction Pattern   Labor complications: None       Additional complications:        Delivery Events:  Indications For Episiotomy:     Episiotomy: None   Perineal Laceration(s): None   Repaired:     Periurethral Laceration Location: left    Repaired: No    Labial Laceration Location:     Repaired:     Sulcal Laceration Location:     Repaired:     Vaginal Laceration Location:     Repaired:     Cervical Laceration Location:     Repaired:     Repair Suture: None   Number of Repair Packets:     Estimated Blood Loss (ml): 300ml     Delivery Date: 10/15/2019    Delivery Time: 4:39 PM  Delivery Type: Vaginal, Spontaneous  Sex:  Female    Gestational Age: 43w4d   Delivery Clinician:  Johnnie Gaming  Living Status: Living   Delivery Location: L&D            APGARS  One minute Five minutes Ten minutes   Skin color: 0   1        Heart rate: 2   2        Grimace: 2   2        Muscle tone: 2   2        Breathin   2        Totals: 8   9            Presentation: Vertex    Position: Left Occiput Anterior  Resuscitation Method:  Suctioning-bulb; Tactile Stimulation     Meconium Stained: None      Cord Information: 3 Vessels  Complications: None  Cord around:    Delayed cord clamping? Yes  Cord clamped date/time:10/15/2019  4:40 PM  Disposition of Cord Blood: Lab    Blood Gases Sent?: Yes    Placenta:  Date/Time: 10/15/2019  4:44 PM  Removal: Spontaneous      Appearance: Normal     Branch Measurements:  Birth Weight: 5 lb 15.6 oz (2.71 kg)      Birth Length: 47.5 cm      Head Circumference: 32.5 cm      Chest Circumference: 30.5 cm     Abdominal Girth: Other Providers:   Jagruti REAL;DC CHAMPAGNE;LISA GAN;NIDA MERLOS, Obstetrician;Primary Nurse;Primary  Nurse;Scrub Tech           Group B Strep:   Lab Results   Component Value Date/Time    GrBStrep, External Negative 2018     Information for the patient's :  Celi Law [[id number]]   No results found for: ABORH, PCTABR, PCTDIG, BILI, ABORHEXT, ABORH    No results for input(s): PCO2CB, PO2CB, HCO3I, SO2I, IBD, PTEMPI, SPECTI, PHICB, ISITE, IDEV, IALLEN in the last 72 hours.  of a VFI at 1639 on 10/15/19* Apgars 8, 9  C/C/+2-->pushed to deliver head LEISA onto intact perineum. Body followed easily thereafter. Delayed cord clamping, baby to mom. Cord clamped/cut. Cord gases were drawn. Placenta delivered S/I/3VC. No lac noted other than a small periurethral lac that did not require suturing. FF w/ pit and massage. . Mom/baby stable.          Soledad Chao MD

## 2019-10-15 NOTE — ROUTINE PROCESS
SBAR OUT Report: Mother Verbal report given to CURTIS Valentin RN (full name & credentials) on this patient, who is now being transferred to MIU (unit) for routine progression of care. The patient is not wearing a green \"Anesthesia-Duramorph\" band. Report consisted of patient's Situation, Background, Assessment and Recommendations (SBAR).  ID bands were compared with the identification form, and verified with the patient and receiving nurse. Information from the SBAR and the [facility] Report was reviewed with the receiving nurse; opportunity for questions and clarification provided.

## 2019-10-15 NOTE — PROGRESS NOTES
Francisco Javier Medrano at bedside at [phone]. RE Valdez at bedside at 1311    Assisted pt to sitting up on bedside at 1312. Timeout completed at [id number] with MD, RE and myself at bedside. Test dose given at 1319. Negative reaction. Dose given at 1323. Pt assisted to lying back in left tilt position. See anesthesia record for details. See vital sign flow sheet for BP. Tolerated procedure well.

## 2019-10-15 NOTE — PROGRESS NOTES
TOCO and US removed and Leeann system atttempt to use. Leeann program access not available due to technical issues with system. TOCO and US reapplied. See strip for details.

## 2019-10-15 NOTE — ANESTHESIA PROCEDURE NOTES
Epidural Block    Start time: 10/15/2019 1:13 PM  End time: 10/15/2019 1:23 PM  Performed by: Kely Mitchell MD  Authorized by: Kely Mitchell MD     Pre-Procedure  Indication: labor epidural    Preanesthetic Checklist: patient identified, risks and benefits discussed, anesthesia consent, patient being monitored, timeout performed and anesthesia consent    Timeout Time: 13:13        Epidural:   Patient position:  Seated  Prep region:  Lumbar  Prep: Chlorhexidine    Location:  L3-4    Needle and Epidural Catheter:   Needle Type:  Tuohy  Needle Gauge:  17 G  Injection Technique:  Loss of resistance using air  Attempts:  1  Catheter Size:  19 G  Catheter at Skin Depth (cm):  15  Depth in Epidural Space (cm):  6  Events: no blood with aspiration, no cerebrospinal fluid with aspiration, no paresthesia and negative aspiration test    Test Dose:  Lidocaine 1.5% w/ epi and negative    Assessment:   Catheter Secured:  Tape and tegaderm  Insertion:  Uncomplicated  Patient tolerance:  Patient tolerated the procedure well with no immediate complications

## 2019-10-15 NOTE — H&P
History & Physical    Name: Charles Mtz MRN: [id number]  SSN: [id number]    YOB: 1990  Age: 30 y.o. Sex: female      Subjective:     Estimated Date of Delivery: 10/28/19  OB History    Para Term  AB Living   2 1 1 0 0 1   SAB TAB Ectopic Molar Multiple Live Births   0 0 0 0 0 1      # Outcome Date GA Lbr Lucas/2nd Weight Sex Delivery Anes PTL Lv   2 Current            1 Term 18 40w0d  7 lb 6.2 oz (3.35 kg) M Vag-Spont EPIDURAL AN N TETE      Obstetric Comments   2018, boy, \"Luis\"       Ms. Mone Mina is a 34yo  admitted with pregnancy at 38w1d for induction of labor due to IUGR. She has been followed closely w/ MFM and 2x/wk testing. Last growth US 10/8/2019 at [facility]: Poor fetal growth, AC 6%, overall 14%, CHRISTINA 15 cm, UA Dopplers WNL, BPP . Pregnancy also complicated by hx Gastric Sleeve, short IC period, obesity, asthma (well controlled), Sickle Trait (FOB neg carrier),    Please see prenatal records for details. Pt was brought in overnight for Cytotec 25mcg--has received 3 total doses. Denies lof, vb, regular ctxs. Past Medical History:   Diagnosis Date    Anemia     Asthma     Sickle cell trait ([facility] [address]     trait not disease     Past Surgical History:   Procedure Laterality Date    HX GASTRIC BYPASS  2017    sleeve, ? [place]    HX OTHER SURGICAL       Social History     Occupational History    Not on file   Tobacco Use    Smoking status: Never Smoker    Smokeless tobacco: Never Used   Substance and Sexual Activity    Alcohol use: No    Drug use: No    Sexual activity: Yes     Partners: Male     Birth control/protection: None     Family History   Problem Relation Age of Onset    Hypertension Neg Hx     Diabetes Neg Hx     Cancer Neg Hx     Stroke Neg Hx        No Known Allergies  Prior to Admission medications    Medication Sig Start Date End Date Taking? Authorizing Provider   omeprazole (PRILOSEC) 20 mg capsule Take 1 Cap by mouth daily. 6/12/19  Yes Carter Stone MD   [id number] Fumarate-FA-DSS-DHA (PNV-DHA + DOCUSATE) 27-1. 25- mg cap Take 1 Cap by mouth daily. Which ever generic PNV/DHA insurance will cover  Indications: pregnancy 6/5/19  Yes Carter Stone MD   raNITIdine (ZANTAC) 150 mg tablet TAKE 1 TABLET BY MOUTH TWICE A DAY 8/5/19   Gavi Perea MD   albuterol (PROVENTIL) 5 mg/mL nebulizer solution by Nebulization route once. Provider, Historical        Review of Systems: A comprehensive review of systems was negative except for that written in the History of Present Illness. Objective:     Vitals:  Vitals:    10/14/19 2015 10/15/19 0240 10/15/19 0534   BP: 97/62 96/55 93/52   Pulse: 83  93   Resp: 16 14 16   Temp: 98.6 °F (37 °C)  98.5 °F (36.9 °C)        Physical Exam:  Constitutional: She appears well-developed and well-nourished. No distress. HENT:    Head: Normocephalic and atraumatic. Lungs: CTAB, effort normal, no rales/crackles  Cardiovascular: RRR, no M/R/G  Abd:  Gravid, no RUQ TTP  Skin: She is not diaphoretic. Psychiatric: She has a normal mood and affect. Her behavior is normal. Thought content normal.     SVE:  Deferred       Prenatal Labs:   Lab Results   Component Value Date/Time    ABO/Rh(D) O POSITIVE 10/14/2019 08:41 PM    Rubella, External Immune 02/05/2018    HBsAg, External Negative 02/05/2018    HIV, External Negative 02/05/2018    RPR, External Negative 02/05/2018    ABO,Rh O Positive 02/05/2018    GrBStrep, External Negative 08/29/2018       Impression/Plan:     Active Problems:    IUGR (intrauterine growth restriction) affecting care of mother (10/14/2019)      38 weeks gestation of pregnancy (10/14/2019)      Encounter for planned induction of labor (10/14/2019)         Plan: Admit for induction of labor. Group B Strep negative this pregnancy, however pt w/ GBS + urine last pregnancy. Based on newest GBS guidelines by ACOG and CDC needs ppx treatment w/ PCN.       Alvin Freeman, MD

## 2019-10-15 NOTE — PROGRESS NOTES
Ge cath in place. SVE 6/100/-1. Pt repositioned to left side on peanut ball. Variable decels continue. Pit decreased to 9 mu.

## 2019-10-15 NOTE — ANESTHESIA PREPROCEDURE EVALUATION
Relevant Problems   No relevant active problems       Anesthetic History   No history of anesthetic complications            Review of Systems / Medical History  Patient summary reviewed, nursing notes reviewed and pertinent labs reviewed    Pulmonary            Asthma : well controlled       Neuro/Psych   Within defined limits           Cardiovascular  Within defined limits                Exercise tolerance: >4 METS     GI/Hepatic/Renal  Within defined limits              Endo/Other        Obesity     Other Findings              Physical Exam    Airway  Mallampati: II      Mouth opening: Normal     Cardiovascular  Regular rate and rhythm,  S1 and S2 normal,  no murmur, click, rub, or gallop             Dental  No notable dental hx       Pulmonary  Breath sounds clear to auscultation               Abdominal         Other Findings            Anesthetic Plan    ASA: 2  Anesthesia type: epidural            Anesthetic plan and risks discussed with: Patient and Mother

## 2019-10-15 NOTE — ROUTINE PROCESS
SBAR IN Report: Mother Verbal report received from Alma Munoz (full name & credentials) on this patient, who is now being transferred from L&D (unit) for routine progression of care. The patient is not wearing a green \"Anesthesia-Duramorph\" band. Report consisted of patient's Situation, Background, Assessment and Recommendations (SBAR).  ID bands were compared with the identification form, and verified with the patient and transferring nurse. Information from the SBAR, Kardex and Procedure Summary and the [place] Report was reviewed with the transferring nurse; opportunity for questions and clarification provided.

## 2019-10-15 NOTE — PROGRESS NOTES
Late Entry from 1345:     SVE unchanged at 3/50/-3    AROM clear this check, pit 18    FHTs Cat I, Filemon Duffy MD

## 2019-10-15 NOTE — PROGRESS NOTES
Pt feeling increased pain and pressure. SVE 9/100/-1. Pt pressed epidural PCA. Pt sitting in throne position.

## 2019-10-16 PROCEDURE — 74011250637 HC RX REV CODE- 250/637: Performed by: OBSTETRICS & GYNECOLOGY

## 2019-10-16 PROCEDURE — 65270000029 HC RM PRIVATE

## 2019-10-16 RX ORDER — FAMOTIDINE 20 MG/1
20 TABLET, FILM COATED ORAL 2 TIMES DAILY
Status: DISCONTINUED | OUTPATIENT
Start: 2019-10-16 | End: 2019-10-17 | Stop reason: HOSPADM

## 2019-10-16 RX ADMIN — OXYCODONE HYDROCHLORIDE AND ACETAMINOPHEN 1 TABLET: 5; 325 TABLET ORAL at 19:45

## 2019-10-16 RX ADMIN — OXYCODONE HYDROCHLORIDE AND ACETAMINOPHEN 1 TABLET: 5; 325 TABLET ORAL at 04:54

## 2019-10-16 RX ADMIN — FAMOTIDINE 20 MG: 20 TABLET ORAL at 21:29

## 2019-10-16 RX ADMIN — OXYCODONE HYDROCHLORIDE AND ACETAMINOPHEN 1 TABLET: 5; 325 TABLET ORAL at 11:31

## 2019-10-16 NOTE — PROGRESS NOTES
Called dr Reba Cisse to request tylenol per pt as pt cannot take motrin due to gastric sleeve. Orders received for tylenol 1000 mg PO q6hr prn. Also notified dr of pt low bp. Pt asymptomatic. Orders to continue to monitor. Read back and verified.

## 2019-10-16 NOTE — PROGRESS NOTES
Patient up to bathroom with This RN assistance. Berenice-care taught and completed. Questions encouraged and answered. Patient ambulating without difficulty, encouraged to call for needs or concerns. Verbalizes understanding.

## 2019-10-16 NOTE — PROGRESS NOTES
Chart reviewed - no needs identified.  made introduction to family and provided informational packet on  mood disorder education/resources. Patient denies any history of postpartum depression/anxiety. Family receptive to receiving information and denied any additional needs from . Family has 's contact information should any needs/questions arise. No PCP - list of PCPs provided.     KEIRA Melendez  [place]   [phone]

## 2019-10-16 NOTE — PROGRESS NOTES
Post-Partum Day Number 1 Progress Note    Patient doing well post-partum without significant complaint. Voiding without difficulty, normal lochia. Vitals:    Patient Vitals for the past 8 hrs:   BP Temp Pulse Resp SpO2   10/16/19 0700 101/55 97.5 °F (36.4 °C) 79 16 95 %     Temp (24hrs), Av.8 °F (36.6 °C), Min:97.4 °F (36.3 °C), Max:98.8 °F (37.1 °C)      Vital signs stable, afebrile. Exam:  Patient without distress. Abdomen soft, fundus firm at level of umbilicus, nontender               Lower extremities are negative for swelling, cords or tenderness. Lab/Data Review: All lab results for the last 24 hours reviewed. Assessment and Plan:  Patient appears to be having uncomplicated post-partum course. Continue routine perineal care and maternal education. Plan discharge tomorrow if no problems occur. Rh pos.

## 2019-10-16 NOTE — PROGRESS NOTES
10/16/19 1946   Pain Assessment   Pain Scale 1 Numeric (0 - 10)   Pain Intensity 1 6   Pain Location 1 Abdomen;Perineum   Pain Description 1 Aching;Cramping; Sore   Pain Intervention(s) 1 Medication (see MAR)     PRN Percocet 5/325mg for pain

## 2019-10-16 NOTE — LACTATION NOTE
This note was copied from a baby's chart. In to see mom and infant for follow up. Mom was to call out for next feed, lactation missed it as in with another patient at the time. She states baby just fed for 15 minutes and back asleep in bassinet at this time. Mom breast fed 1st child for around 1 months old- now only 12 months old. States supply was okay overall. Has relative that is a lactation consultant that helped her w/ first child. Encouraged mom to call out again later today if desires any help w/ latching or feeding observation/assistance, etc. Reviewed 2nd 24 hrs feeding/output expectations, normalcy of periods of cluster feeding. Mom has no specific needs or questions at this time. Lactation to continue to follow for support.

## 2019-10-16 NOTE — ANESTHESIA POSTPROCEDURE EVALUATION
* No procedures listed *.    epidural    Anesthesia Post Evaluation      Multimodal analgesia: multimodal analgesia used between 6 hours prior to anesthesia start to PACU discharge  Patient location during evaluation: bedside  Patient participation: complete - patient participated  Level of consciousness: awake and alert  Pain management: adequate  Airway patency: patent  Anesthetic complications: no  Cardiovascular status: acceptable  Respiratory status: nonlabored ventilation and acceptable  Hydration status: acceptable  Post anesthesia nausea and vomiting:  none      No vitals data found for the desired time range.

## 2019-10-16 NOTE — LACTATION NOTE

## 2019-10-17 VITALS
SYSTOLIC BLOOD PRESSURE: 97 MMHG | DIASTOLIC BLOOD PRESSURE: 53 MMHG | HEART RATE: 76 BPM | OXYGEN SATURATION: 98 % | TEMPERATURE: 98 F | RESPIRATION RATE: 16 BRPM

## 2019-10-17 PROCEDURE — 74011250637 HC RX REV CODE- 250/637: Performed by: OBSTETRICS & GYNECOLOGY

## 2019-10-17 RX ORDER — OXYCODONE AND ACETAMINOPHEN 5; 325 MG/1; MG/1
1 TABLET ORAL
Qty: 20 TAB | Refills: 0 | Status: SHIPPED | OUTPATIENT
Start: 2019-10-17 | End: 2019-10-22

## 2019-10-17 RX ADMIN — FAMOTIDINE 20 MG: 20 TABLET ORAL at 09:17

## 2019-10-17 RX ADMIN — OXYCODONE HYDROCHLORIDE AND ACETAMINOPHEN 1 TABLET: 5; 325 TABLET ORAL at 04:53

## 2019-10-17 RX ADMIN — OXYCODONE HYDROCHLORIDE AND ACETAMINOPHEN 1 TABLET: 5; 325 TABLET ORAL at 10:19

## 2019-10-17 RX ADMIN — WITCH HAZEL 1 PAD: 500 SOLUTION RECTAL; TOPICAL at 13:55

## 2019-10-17 RX ADMIN — Medication 1 SPRAY: at 13:55

## 2019-10-17 RX ADMIN — OXYCODONE HYDROCHLORIDE AND ACETAMINOPHEN 1 TABLET: 5; 325 TABLET ORAL at 01:01

## 2019-10-17 NOTE — LACTATION NOTE
Mom and baby are going home today. Continue to offer the breast without restriction. Mom's milk should be fully in over the next few days. Reviewed engorgement precautions. Hand Expression has been demoed and written hand-out reviewed. As milk comes in baby will be more alert at the breast and swallows will be more obvious. Breasts may feel softer once baby has finished nursing. Baby should be back to birth weight by 3weeks of age. And then gain on average 1 oz per day for the next 2-3 months. Reviewed babies should be exclusively breastfeeding for the first 6 months and that breastfeeding should continue after introduction of appropriate complimentary foods after 6 months. Initial output should be at least 1 wet and 1 bowel movement for each day old baby is. By day 5-7 once milk is fully in baby will consistently have 6 or more soaking wet diapers and about 4 bowel movement. Some babies have a bowel movement with every feeding and some have 1-3 large bowel movements each day. Inadequate output may indicate inadequate feedings and should be reported to your Pediatrician. Bowel habits may change as baby gets older. Encouraged follow-up at Pediatrician in 1-2 days, no later than 1 week of age. Call [facility] for any questions as needed or to set up an OP visit. OP phone calls are returned within 24 hours. Community Breastfeeding Resource List given.

## 2019-10-17 NOTE — PROGRESS NOTES
Discharge teaching complete. Mother verbalized understanding, questions encouraged. Jarrell sheet signed. Patient discharged to home per MD orders. Discharge instructions reviewed with patient. Questions encouraged and answered. Patient verbalizes understanding. Patient escorted by MIU staff to private vehicle. Stable at discharge.

## 2019-10-17 NOTE — LACTATION NOTE
In to see mom and infant prior to discharge to home. Experienced mom stated that infant is still latching and nursing well. Stated that infant did cluster feed during the night. Reviewed discharge inforamtion.  Encouraged mom to follow up with our outpatient  lactation consultant as needed

## 2019-10-17 NOTE — DISCHARGE INSTRUCTIONS
Discharge instruction to follow: Activity: Pelvis rest for 6 weeks     No heavy lifting over 15 lbs for 2 weeks     No driving for 2 weeks     No push/pull motion such as sweeping or vacuuming for 2 weeks     No tub baths for 6 weeks    Continue using the hygenique wand after each void or bowel movement. If using sitz bath continue until comfortable stopping. If using gary-bottle continue to use until comfortable stopping. Change sanitary pad after each urination or bowel movement. Call MD for the following:      Fever over 101 F; pain not relieved by medication; foul smelling vaginal discharge or an increase in vaginal bleeding. Take medication as prescribed. Follow up with MD as order. Vaginal Childbirth: Care Instructions  Your Care Instructions    Your body will slowly heal in the next few weeks. It is easy to get too tired and overwhelmed during the first weeks after your baby is born. Changes in your hormones can shift your mood without warning. You may find it hard to meet the extra demands on your energy and time. Take it easy on yourself. Follow-up care is a key part of your treatment and safety. Be sure to make and go to all appointments, and call your doctor if you are having problems. It's also a good idea to know your test results and keep a list of the medicines you take. How can you care for yourself at home? · Vaginal bleeding and cramps  ? After delivery, you will have a bloody discharge from the vagina. This will turn pink within a week and then white or yellow after about 10 days. It may last for 2 to 4 weeks or longer, until the uterus has healed. Use pads instead of tampons until you stop bleeding. ? Do not worry if you pass some blood clots, as long as they are smaller than a golf ball. If you have a tear or stitches in your vaginal area, change the pad at least every 4 hours to prevent soreness and infection. ?  You may have cramps for the first few days after childbirth. These are normal and occur as the uterus shrinks to normal size. Take an over-the-counter pain medicine, such as acetaminophen (Tylenol), ibuprofen (Advil, Motrin), or naproxen (Aleve), for cramps. Read and follow all instructions on the label. Do not take aspirin, because it can cause more bleeding. ? Do not take two or more pain medicines at the same time unless the doctor told you to. Many pain medicines have acetaminophen, which is Tylenol. Too much acetaminophen (Tylenol) can be harmful. · Stitches  ? If you have stitches, they will dissolve on their own and do not need to be removed. Follow your doctor's instructions for cleaning the stitched area. ? Put ice or a cold pack on your painful area for 10 to 20 minutes at a time, several times a day, for the first few days. Put a thin cloth between the ice and your skin. ? Sit in a few inches of warm water (sitz bath) 3 times a day and after bowel movements. The warm water helps with pain and itching. If you do not have a tub, a warm shower might help. · Breast fullness  ? Your breasts may overfill (engorge) in the first few days after delivery. To help milk flow and to relieve pain, warm your breasts in the shower or by using warm, moist towels before nursing. ? If you are not nursing, do not put warmth on your breasts or touch your breasts. Wear a tight bra or sports bra and use ice until the fullness goes away. This usually takes 2 to 3 days. ? Put ice or a cold pack on your breast after nursing to reduce swelling and pain. Put a thin cloth between the ice and your skin. · Activity  ? Eat a balanced diet. Do not try to lose weight by cutting calories. Keep taking your prenatal vitamins, or take a multivitamin. ? Get as much rest as you can. Try to take naps when your baby sleeps during the day. ? Get some exercise every day. But do not do any heavy exercise until your doctor says it is okay.   ? Wait until you are healed (about 4 to 6 weeks) before you have sexual intercourse. Your doctor will tell you when it is okay to have sex. ? Talk to your doctor about birth control. You can get pregnant even before your period returns. Also, you can get pregnant while you are breastfeeding. · Mental health  ? It is normal to have some sadness, anxiety, sleeplessness, and mood swings after you go home. If you feel upset or hopeless for more than a few days or are having trouble doing the things you need to do, talk to your doctor. · Constipation and hemorrhoids  ? Drink plenty of fluids, enough so that your urine is light yellow or clear like water. If you have kidney, heart, or liver disease and have to limit fluids, talk with your doctor before you increase the amount of fluids you drink. ? Eat plenty of fiber each day. Have a bran muffin or bran cereal for breakfast, and try eating a piece of fruit for a mid-afternoon snack. ? For painful, itchy hemorrhoids, put ice or a cold pack on the area several times a day for 10 minutes at a time. Follow this by putting a warm compress on the area for another 10 to 20 minutes or by sitting in a shallow, warm bath. When should you call for help? Call 911 anytime you think you may need emergency care. For example, call if:    · You have thoughts of harming yourself, your baby, or another person.     · You passed out (lost consciousness).     · You have chest pain, are short of breath, or cough up blood.     · You have a seizure.    Call your doctor now or seek immediate medical care if:    · You have severe vaginal bleeding.     · You are dizzy or lightheaded, or you feel like you may faint.     · You have a fever.     · You have new or more pain in your belly or pelvis.     · You have symptoms of a blood clot in your leg (called a deep vein thrombosis), such as:  ? Pain in the calf, back of the knee, thigh, or groin. ?  Redness and swelling in your leg or groin.     · You have signs of preeclampsia, such as:  ? Sudden swelling of your face, hands, or feet. ? New vision problems (such as dimness, blurring, or seeing spots). ? A severe headache.    Watch closely for changes in your health, and be sure to contact your doctor if:    · Your vaginal bleeding seems to be getting heavier.     · You have new or worse vaginal discharge.     · You feel sad, anxious, or hopeless for more than a few days.     · You do not get better as expected. Where can you learn more? Go to http://ifeoma-pancho.info/. Enter P777 in the search box to learn more about \"Vaginal Childbirth: Care Instructions. \"  Current as of: May 29, 2019  Content Version: 12.2  © 6209-7782 [employer]. Care instructions adapted under license by [employer] (which disclaims liability or warranty for this information). If you have questions about a medical condition or this instruction, always ask your healthcare professional. [address] any warranty or liability for your use of this information.

## 2019-10-17 NOTE — PROGRESS NOTES
Call placed to Dr. Nati Zhang regarding patient c/o \"heart burn\". Orders received for Pepcid 20 mg po bid with dose to start now.

## 2019-10-17 NOTE — PROGRESS NOTES
Post-Partum Day Number 2 Progress Note    Patient doing well post-partum day #2 without significant complaint. Voiding without difficulty, normal lochia. Patient Vitals for the past 24 hrs:   Temp Pulse Resp BP SpO2   10/17/19 0703 98 °F (36.7 °C) 76 16 97/53 98 %   10/16/19 1915 98 °F (36.7 °C) 87 16 97/56 98 %       Exam:  Patient without distress. Abdomen soft, fundus firm at level of umbilicus, nontender               Perineum with normal lochia noted. Lower extremities are negative for swelling, cords or tenderness. Recent Results (from the past 48 hour(s))   BLOOD GAS, CORD BLOOD    Collection Time: 10/15/19  5:05 PM   Result Value Ref Range    Device: ROOM AIR      pH, cord blood (POC) 7.322 7.15 - 7.38      pCO2 cord blood 50 32 - 68 mmHg    pO2 cord blood 19 mmHg    HCO3 (POC) 26.0 22 - 26 MMOL/L    sO2 (POC) 26 (L) 95 - 98 %    Base deficit (POC) 1 mmol/L    Allens test (POC) NOT APPLICABLE      Site CORD      Patient temp. 98.6      Specimen type (POC) ARTERIAL CORD      Performed by [employer]     CO2, POC 27 MMOL/L    COLLECT TIME 1,639     BLOOD GAS, CORD BLOOD    Collection Time: 10/15/19  5:11 PM   Result Value Ref Range    Device: ROOM AIR      pH, cord blood (POC) 7.370 7.15 - 7.38      pCO2 cord blood 41 32 - 68 mmHg    pO2 cord blood 34 mmHg    HCO3, venous (POC) 23.8 23 - 28 MMOL/L    sO2, venous (POC) 64 (L) 65 - 88 %    Base deficit (POC) 1 mmol/L    Allens test (POC) NOT APPLICABLE      Site CORD      Patient temp.  98.6      Specimen type (POC) VENOUS CORD      Performed by [employer]     CO2, POC 25 MMOL/L    COLLECT TIME 1,639         Recent Labs     10/14/19  2041 09/26/19  1207 04/26/19  0936   WBC 10.5 8.6 7.4   HGB 10.0* 9.8* 11.3   HCT 30.7* 30.5* 34.8    330 359       Recent Labs     09/26/19  1207      K 4.2      CO2 23   GLU 66   BUN 9   CREA 0.65   GFRAA 140   GFRNA 121   CA 8.8   ALB 3.4*   TP 6.2 AGRAT 1.2   SGOT 13   ALT 10       Recent Labs     09/26/19  1207   URICA 3.7          Recent Labs     09/26/19  1207   GLU 66       Prenatal Labs:    Lab Results   Component Value Date/Time    Rubella, External Immune 02/05/2018    GrBStrep, External Negative 08/29/2018    HBsAg, External Negative 02/05/2018    HIV, External Negative 02/05/2018    RPR, External Negative 02/05/2018       Problem List  Date Reviewed: 10/15/2019          Codes Class Noted    * (Principal) IUGR (intrauterine growth restriction) affecting care of mother ICD-10-CM: O36.5990  ICD-9-CM: 656.50  10/14/2019        38 weeks gestation of pregnancy ICD-10-CM: Z3A.38  ICD-9-CM: V22.2  10/14/2019        Encounter for planned induction of labor ICD-10-CM: Z34.90  ICD-9-CM: V22.1  10/14/2019        Suspected fetal abnormality affecting management of mother ICD-10-CM: O35. 9XX0  ICD-9-CM: 655.90  7/26/2019    Overview Signed 7/26/2019 10:14 AM by Franki Deunas RN     See High Risk Pregnancy Overview             Poor fetal growth, affecting management of mother, antepartum condition or complication ONM-81-RP: N41.8829  ICD-9-CM: 656.53  7/26/2019    Overview Addendum 8/14/2019  8:17 AM by Franki Duenas RN     See High Risk Pregnancy Overview             Obesity affecting pregnancy in third trimester ICD-10-CM: O99.213  ICD-9-CM: 649.13  7/24/2019    Overview Addendum 7/26/2019 10:16 AM by Franki Duenas RN     7/26/2019 at [facility]:  Patient with BMI of 35. See High Risk Pregnancy Overview             High-risk pregnancy in third trimester ICD-10-CM: O09.93  ICD-9-CM: V23.9  6/11/2019    Overview Addendum 10/8/2019  2:08 PM by Patrick Godinez MD     1)hx bariatric surgery-gastric sleeve  2)asthma  3)sickle cell trait. FOB neg.  Needs c&s q trimester  4)prenatal labs and hgbA1c wnl  5)short interval pregnancy  34w:  Flu vacc given, had Tdap ~ 9/22/18 7/26/2019 at [facility]:  Normal anatomy/fetal Echo with exception of prominent cisterna magna at 11.4 mm. IUGR today; AC 4%, overall 27%, AC lag, likely associated with food aversions + gastric sleeve + short interval pregnancy. Optimize nutrition. 8/14/2019 at [facility]:  Severe growth restriction; stable. Reassuring fetal status. Normal f/u Echo. AC 4%, overall 29%, CHRISTINA 15 cm, UA Dopplers WNL, BPP 8/8. Still working but sits all day  9/19/2019 at [facility]: Appropriate interval growth, Poor fetal growth, Reassuring fetal status AC 6%, overall 20%, CHRISTINA 12.6 cm, UA Dopplers WNL, BPP 6/8 (no breathing)---> REACTIVE NST. 10/8/2019 at [facility]: Poor fetal growth, Reassuring fetal status AC 6%, overall 14%, CHRISTINA 15 cm, UA Dopplers WNL, BPP 8/8    · No follow up scheduled at [facility]; will see back prn. · Continue twice weekly fetal testing in OB office. · Out of work for remainder of pregnancy. · Increase calories/protein, decrease activity to maximize fetal growth. Protein handout given 7/26/2019. · Fetal kick counts stressed. · Plan delivery at 38 weeks, sooner for fetal or maternal indications             Pregnancy affected by previous bariatric surgery, currently in third trimester ICD-10-CM: O99.843  ICD-9-CM: 649.23  3/14/2018    Overview Addendum 8/14/2019  9:07 AM by Nelida Kerr RN     7/26/2019 at [facility]:  Patient with history of Gastric Sleeve in 4/2017. Lost 117#. Pt going to start checking BG's this week x 1-2 weeks. Testing supplies given by OB.  8/14/2019 at [facility]:  Pt states checked BG's x 1.5 weeks and highest reading was 101. · Does not need to check BG any longer. See High Risk Pregnancy Overview             Maternal sickle cell anemia complicating pregnancy in third trimester [facility]) ICD-10-CM: O99.013, D57.1  ICD-9-CM: 648.23, 282.60  3/14/2018    Overview Addendum 9/19/2019  8:57 AM by Nelida Kerr RN     7/26/2019 at [facility]:  Pt with Sickle Cell Trait. FOB, Alia Vilchis, is negative carrier.  Tested 3/13/18.  9/11/19 (32w)-->urine culture NEGATIVE    Patient is at increased risk for asymptomatic bacturia and UTI. Will need q trimester screening for UTI with UA and culture. See High Risk Pregnancy Overview                       Current Facility-Administered Medications   Medication Dose Route Frequency    famotidine (PEPCID) tablet 20 mg  20 mg Oral BID    ibuprofen (MOTRIN) tablet 800 mg  800 mg Oral Q6H PRN    oxyCODONE-acetaminophen (PERCOCET) 5-325 mg per tablet 1 Tab  1 Tab Oral Q4H PRN    naloxone (NARCAN) injection 0.4 mg  0.4 mg IntraVENous PRN    ondansetron (ZOFRAN) injection 8 mg  8 mg IntraVENous Q8H PRN    bisacodyl (DULCOLAX) tablet 5 mg  5 mg Oral DAILY PRN    diphenhydrAMINE (BENADRYL) capsule 25 mg  25 mg Oral Q4H PRN    acetaminophen (TYLENOL) tablet 1,000 mg  1,000 mg Oral Q6H PRN       OB History    Para Term  AB Living   2 2 2 0 0 2   SAB TAB Ectopic Molar Multiple Live Births   0 0 0 0 0 2      # Outcome Date GA Lbr Lucas/2nd Weight Sex Delivery Anes PTL Lv   2 Term 10/15/19 38w1d 07:01 / 00:08 5 lb 15.6 oz (2.71 kg) F Vag-Spont EPI N TETE      Name: Kemp Res: 8  Apgar5: 9   1 Term 18 40w0d  7 lb 6.2 oz (3.35 kg) M Vag-Spont EPIDURAL AN N TETE      Name: Ashley Churchamandas: 9  Apgar5: 9      Obstetric Comments   2018, boy, \"Luis\"       Assessment and Plan:      PPD 2:  Patient appears to be having uncomplicated post-partum course. Continue routine perineal care and maternal education. Plan discharge home today.     ABO Group   Date Value Ref Range Status   2019 O  Final     Rh (D)   Date Value Ref Range Status   2019 Positive  Final     Comment:     Please note: Prior records for this patient's ABO / Rh type are not  available for additional verification.        Rubella Ab, IgG   Date Value Ref Range Status   2019 2.33 Immune >0.99 index Final     Comment:                                     Non-immune       <0.90                                  Equivocal  0.90 - 0.99                                  Immune           >0.99         Pt is an experienced breastfeeder. SIDs  precautions reviewed    D/w pt:  Infx/driving/physical activity/pelvic rest precautions    Pelvic rest x 6 wk ( no sex, swimming, tampons or douching)  Pt may drive after 2-4 weeks as long as she is NOT taking narcotics & can quickly maneuver her foot from the gas to the brake. For the next 2-4 weeks:  eat, shower and nurse the baby. Advance activity as tolerated. Notify [facility] for temp > 100.5, vaginal discharge with odor, heavy VB, worsening pelvic/abdominal pain and/or other concerns.

## 2019-10-17 NOTE — PROGRESS NOTES
10/17/19 0453   Pain Assessment   Pain Scale 1 Numeric (0 - 10)   Pain Intensity 1 6   Pain Location 1 Abdomen;Perineum   Pain Description 1 Aching;Sore;Cramping   Pain Intervention(s) 1 Medication (see MAR)    Percocet 5/325mg for pain

## 2019-10-17 NOTE — DISCHARGE SUMMARY
Obstetrical Discharge Summary     Name: Jay Smith MRN: [id number]  SSN: [id number]    YOB: 1990  Age: 30 y.o. Sex: female      Allergies: Patient has no known allergies. Admit Date: 10/14/2019    Discharge Date: 10/17/2019     Admitting Physician: Gabriel Diaz MD     Attending Physician:  Shannan Arceo MD     * Admission Diagnoses: 38 weeks gestation of pregnancy [Z3A.38];IUGR (intrauterine growth restriction) affecting care of mother [O39.5]; Encounter for planned induction of labor [Z34.90]    * Discharge Diagnoses:   Information for the patient's :  Stefany Herrera [[id number]]   Delivery of a 5 lb 15.6 oz (2.71 kg) female infant via Vaginal, Spontaneous on 10/15/2019 at 4:39 PM  by Federico Swanson. Apgars were 8  and 9 . Additional Diagnoses:   Hospital Problems as of 10/17/2019 Date Reviewed: 10/15/2019          Codes Class Noted - Resolved POA    * (Principal) IUGR (intrauterine growth restriction) affecting care of mother ICD-10-CM: O36.5990  ICD-9-CM: 656.50  10/14/2019 - Present Unknown        38 weeks gestation of pregnancy ICD-10-CM: Z3A.38  ICD-9-CM: V22.2  10/14/2019 - Present Unknown        Encounter for planned induction of labor ICD-10-CM: Z34.90  ICD-9-CM: V22.1  10/14/2019 - Present Unknown             Lab Results   Component Value Date/Time    ABO/Rh(D) O POSITIVE 10/14/2019 08:41 PM    Rubella, External Immune 2018    GrBStrep, External Negative 2018    ABO,Rh O Positive 2018      Immunization History   Administered Date(s) Administered    Influenza Vaccine (Quad) PF 2019       * Procedures:   * No surgery found *           * Discharge Condition: good    * Hospital Course: Normal hospital course following the delivery.     * Disposition: Home    Discharge Medications:   Current Discharge Medication List      START taking these medications    Details   oxyCODONE-acetaminophen (PERCOCET) 5-325 mg per tablet Take 1 Tab by mouth every six (6) hours as needed for Pain for up to 5 days. Max Daily Amount: 4 Tabs. Indications: pain  Qty: 20 Tab, Refills: 0    Associated Diagnoses: Postpartum pain         CONTINUE these medications which have NOT CHANGED    Details   omeprazole (PRILOSEC) 20 mg capsule Take 1 Cap by mouth daily. Qty: 30 Cap, Refills: 12      PNV66-Iron Fumarate-FA-DSS-DHA (PNV-DHA + DOCUSATE) 27-1. 25- mg cap Take 1 Cap by mouth daily. Which ever generic PNV/DHA insurance will cover  Indications: pregnancy  Qty: 90 Cap, Refills: 3      raNITIdine (ZANTAC) 150 mg tablet TAKE 1 TABLET BY MOUTH TWICE A DAY  Qty: 60 Tab, Refills: 2      albuterol (PROVENTIL) 5 mg/mL nebulizer solution by Nebulization route once. * Follow-up Care/Patient Instructions:   Activity: No lifting, Driving, or Strenuous exercise for 2-4 weeks, No sex for 6 weeks and No driving while on analgesics  Diet: Regular Diet  Wound Care: As directed    Follow-up Information     Follow up With Specialties Details Why Contact Info    None    None ([phone]) Patient stated that they have no PCP

## 2019-11-20 PROBLEM — E66.01 SEVERE OBESITY (HCC): Status: ACTIVE | Noted: 2019-11-20

## 2019-11-27 PROBLEM — O09.93 HIGH-RISK PREGNANCY IN THIRD TRIMESTER: Status: RESOLVED | Noted: 2019-06-11 | Resolved: 2019-11-27

## 2019-11-27 PROBLEM — O99.843 PREGNANCY AFFECTED BY PREVIOUS BARIATRIC SURGERY, CURRENTLY IN THIRD TRIMESTER: Status: RESOLVED | Noted: 2018-03-14 | Resolved: 2019-11-27

## 2019-11-27 PROBLEM — O99.013 MATERNAL SICKLE CELL ANEMIA COMPLICATING PREGNANCY IN THIRD TRIMESTER (HCC): Status: RESOLVED | Noted: 2018-03-14 | Resolved: 2019-11-27

## 2019-11-27 PROBLEM — D57.1 MATERNAL SICKLE CELL ANEMIA COMPLICATING PREGNANCY IN THIRD TRIMESTER (HCC): Status: RESOLVED | Noted: 2018-03-14 | Resolved: 2019-11-27

## 2019-11-27 PROBLEM — O99.213 OBESITY AFFECTING PREGNANCY IN THIRD TRIMESTER: Status: RESOLVED | Noted: 2019-07-24 | Resolved: 2019-11-27

## 2022-03-19 PROBLEM — O35.9XX0 SUSPECTED FETAL ABNORMALITY AFFECTING MANAGEMENT OF MOTHER: Status: ACTIVE | Noted: 2019-07-26

## 2022-03-19 PROBLEM — Z34.90 ENCOUNTER FOR PLANNED INDUCTION OF LABOR: Status: ACTIVE | Noted: 2019-10-14

## 2022-03-19 PROBLEM — O36.5990 POOR FETAL GROWTH, AFFECTING MANAGEMENT OF MOTHER, ANTEPARTUM CONDITION OR COMPLICATION: Status: ACTIVE | Noted: 2019-07-26

## 2022-03-19 PROBLEM — O36.5990 IUGR (INTRAUTERINE GROWTH RESTRICTION) AFFECTING CARE OF MOTHER: Status: ACTIVE | Noted: 2019-10-14

## 2022-03-19 PROBLEM — Z3A.38 38 WEEKS GESTATION OF PREGNANCY: Status: ACTIVE | Noted: 2019-10-14

## 2022-03-19 PROBLEM — E66.01 SEVERE OBESITY (HCC): Status: ACTIVE | Noted: 2019-11-20

## 2022-08-10 ENCOUNTER — OFFICE VISIT (OUTPATIENT)
Dept: INTERNAL MEDICINE CLINIC | Facility: CLINIC | Age: 32
End: 2022-08-10
Payer: COMMERCIAL

## 2022-08-10 VITALS
BODY MASS INDEX: 34.41 KG/M2 | WEIGHT: 194.2 LBS | TEMPERATURE: 98.1 F | HEART RATE: 80 BPM | OXYGEN SATURATION: 95 % | DIASTOLIC BLOOD PRESSURE: 75 MMHG | HEIGHT: 63 IN | SYSTOLIC BLOOD PRESSURE: 108 MMHG

## 2022-08-10 DIAGNOSIS — J45.20 MILD INTERMITTENT ASTHMA, UNSPECIFIED WHETHER COMPLICATED: Primary | ICD-10-CM

## 2022-08-10 DIAGNOSIS — Z13.1 SCREENING FOR DIABETES MELLITUS: ICD-10-CM

## 2022-08-10 DIAGNOSIS — Z13.29 SCREENING FOR THYROID DISORDER: ICD-10-CM

## 2022-08-10 DIAGNOSIS — L70.9 ACNE, UNSPECIFIED ACNE TYPE: ICD-10-CM

## 2022-08-10 DIAGNOSIS — Z11.59 NEED FOR HEPATITIS C SCREENING TEST: ICD-10-CM

## 2022-08-10 DIAGNOSIS — Z13.6 SCREENING, ISCHEMIC HEART DISEASE: ICD-10-CM

## 2022-08-10 PROCEDURE — 99204 OFFICE O/P NEW MOD 45 MIN: CPT | Performed by: FAMILY MEDICINE

## 2022-08-10 RX ORDER — FLUTICASONE PROPIONATE AND SALMETEROL 250; 50 UG/1; UG/1
1 POWDER RESPIRATORY (INHALATION) EVERY 12 HOURS
Qty: 60 EACH | Refills: 3 | Status: SHIPPED | OUTPATIENT
Start: 2022-08-10

## 2022-08-10 RX ORDER — DOXYCYCLINE HYCLATE 100 MG
100 TABLET ORAL 2 TIMES DAILY
Qty: 20 TABLET | Refills: 0 | Status: SHIPPED | OUTPATIENT
Start: 2022-08-10 | End: 2022-08-20

## 2022-08-10 RX ORDER — ERYTHROMYCIN AND BENZOYL PEROXIDE 30; 50 MG/G; MG/G
GEL TOPICAL
Qty: 46.6 G | Refills: 3 | Status: SHIPPED | OUTPATIENT
Start: 2022-08-10 | End: 2022-09-09

## 2022-08-10 ASSESSMENT — PATIENT HEALTH QUESTIONNAIRE - PHQ9
SUM OF ALL RESPONSES TO PHQ QUESTIONS 1-9: 0
2. FEELING DOWN, DEPRESSED OR HOPELESS: 0
SUM OF ALL RESPONSES TO PHQ QUESTIONS 1-9: 0
SUM OF ALL RESPONSES TO PHQ9 QUESTIONS 1 & 2: 0
1. LITTLE INTEREST OR PLEASURE IN DOING THINGS: 0
SUM OF ALL RESPONSES TO PHQ QUESTIONS 1-9: 0
SUM OF ALL RESPONSES TO PHQ QUESTIONS 1-9: 0

## 2022-08-10 ASSESSMENT — ENCOUNTER SYMPTOMS
SHORTNESS OF BREATH: 1
COUGH: 0

## 2022-08-10 NOTE — PROGRESS NOTES
Agnieszka Ordonez (:  1990) is a 33 y.o. female,Established patient, here for evaluation of the following chief complaint(s):  Establish Care (NP- new to area. Needs medical certification for work to stay working remotely. )         ASSESSMENT/PLAN:  1. Mild intermittent asthma, unspecified whether complicated  The following orders have not been finalized:  -     fluticasone-salmeterol (ADVAIR DISKUS) 250-50 MCG/ACT AEPB diskus inhaler  2. Acne, unspecified acne type  1. Asthma uncontrolled. Will place on maintenance inhaler. Advised to gargle after use. Asthma not severe enough to write a letter for remote working. Will refer to allergist.  2.  Acne. Will place on Benzamycin gel and doxycycline. Will refer to dermatology if not better. Return in about 4 weeks (around 2022) for ffup for cpe with labs prior. Subjective   SUBJECTIVE/OBJECTIVE:  71-year-old female comes in to [facility]. Medical illnesses include  1. Asthma. Patient taking albuterol as needed. Takes when she gets cold or exercises. Also triggered by allergies. Uses albuterol 3x a day. Never been on daily inhaler. No hospitalization for asthma in last few years. 2. Sickle cell trait. Never had a crisis. Patient has been told to stay hydrated. 3. Bmi 34. Was 290 lbs s/p gastric sleeve 2017. Got down to 150 lbs then got pregnant. Steady at present weight. Now weighs 190 pounds. + exercise. Not following diet. Decreased food intake. 4. GM with diabetes. Elevated sugar during pregnancy. Grandparent with diabetes. 5.  Patient complains of acne since she started Mirena. Review of Systems   Constitutional:  Negative for chills and fever. Respiratory:  Positive for shortness of breath. Negative for cough. Objective   Physical Exam  Constitutional:       Appearance: Normal appearance. She is obese. HENT:      Head: Normocephalic. Neck:      Vascular: No carotid bruit.    Cardiovascular:      Rate and Rhythm: Normal rate and regular rhythm. Heart sounds: Normal heart sounds. Pulmonary:      Effort: Pulmonary effort is normal.      Breath sounds: Normal breath sounds. Abdominal:      General: Abdomen is flat. Bowel sounds are normal.      Palpations: Abdomen is soft. Musculoskeletal:      Cervical back: No tenderness. Right lower leg: No edema. Left lower leg: No edema. Lymphadenopathy:      Cervical: No cervical adenopathy. Skin:     Comments: Papulopustular acne on face   Neurological:      General: No focal deficit present. Mental Status: She is alert. Psychiatric:         Mood and Affect: Mood normal.                An electronic signature was used to authenticate this note.     --Tere Rivers MD

## 2024-07-05 NOTE — PROGRESS NOTES
Bedside report received from Clarence Stephens RN. Patient care assumed. Detail Level: Detailed Quality 226: Preventive Care And Screening: Tobacco Use: Screening And Cessation Intervention: Patient screened for tobacco use and is an ex/non-smoker